# Patient Record
Sex: MALE | Race: WHITE | NOT HISPANIC OR LATINO
[De-identification: names, ages, dates, MRNs, and addresses within clinical notes are randomized per-mention and may not be internally consistent; named-entity substitution may affect disease eponyms.]

---

## 2017-04-11 PROBLEM — Z00.00 ENCOUNTER FOR PREVENTIVE HEALTH EXAMINATION: Status: ACTIVE | Noted: 2017-04-11

## 2017-05-10 ENCOUNTER — APPOINTMENT (OUTPATIENT)
Dept: PULMONOLOGY | Facility: CLINIC | Age: 62
End: 2017-05-10

## 2022-06-27 ENCOUNTER — EMERGENCY (EMERGENCY)
Facility: HOSPITAL | Age: 67
LOS: 1 days | Discharge: HOME | End: 2022-06-27
Attending: EMERGENCY MEDICINE | Admitting: EMERGENCY MEDICINE
Payer: COMMERCIAL

## 2022-06-27 VITALS
SYSTOLIC BLOOD PRESSURE: 126 MMHG | WEIGHT: 181 LBS | RESPIRATION RATE: 19 BRPM | HEIGHT: 68 IN | OXYGEN SATURATION: 100 % | HEART RATE: 66 BPM | TEMPERATURE: 98 F | DIASTOLIC BLOOD PRESSURE: 81 MMHG

## 2022-06-27 LAB
ALBUMIN SERPL ELPH-MCNC: 3.9 G/DL — SIGNIFICANT CHANGE UP (ref 3.3–5)
ALP SERPL-CCNC: 67 U/L — SIGNIFICANT CHANGE UP (ref 30–120)
ALT FLD-CCNC: 32 U/L DA — SIGNIFICANT CHANGE UP (ref 10–60)
AMPHET UR-MCNC: NEGATIVE — SIGNIFICANT CHANGE UP
ANION GAP SERPL CALC-SCNC: 7 MMOL/L — SIGNIFICANT CHANGE UP (ref 5–17)
APPEARANCE UR: CLEAR — SIGNIFICANT CHANGE UP
AST SERPL-CCNC: 32 U/L — SIGNIFICANT CHANGE UP (ref 10–40)
BARBITURATES UR SCN-MCNC: NEGATIVE — SIGNIFICANT CHANGE UP
BASOPHILS # BLD AUTO: 0.06 K/UL — SIGNIFICANT CHANGE UP (ref 0–0.2)
BASOPHILS NFR BLD AUTO: 0.7 % — SIGNIFICANT CHANGE UP (ref 0–2)
BENZODIAZ UR-MCNC: NEGATIVE — SIGNIFICANT CHANGE UP
BILIRUB SERPL-MCNC: 0.6 MG/DL — SIGNIFICANT CHANGE UP (ref 0.2–1.2)
BILIRUB UR-MCNC: NEGATIVE — SIGNIFICANT CHANGE UP
BUN SERPL-MCNC: 22 MG/DL — SIGNIFICANT CHANGE UP (ref 7–23)
CALCIUM SERPL-MCNC: 9.6 MG/DL — SIGNIFICANT CHANGE UP (ref 8.4–10.5)
CHLORIDE SERPL-SCNC: 102 MMOL/L — SIGNIFICANT CHANGE UP (ref 96–108)
CO2 SERPL-SCNC: 26 MMOL/L — SIGNIFICANT CHANGE UP (ref 22–31)
COCAINE METAB.OTHER UR-MCNC: NEGATIVE — SIGNIFICANT CHANGE UP
COLOR SPEC: YELLOW — SIGNIFICANT CHANGE UP
CREAT SERPL-MCNC: 1.09 MG/DL — SIGNIFICANT CHANGE UP (ref 0.5–1.3)
D DIMER BLD IA.RAPID-MCNC: <150 NG/ML DDU — SIGNIFICANT CHANGE UP
DIFF PNL FLD: NEGATIVE — SIGNIFICANT CHANGE UP
EGFR: 73 ML/MIN/1.73M2 — SIGNIFICANT CHANGE UP
EOSINOPHIL # BLD AUTO: 0.32 K/UL — SIGNIFICANT CHANGE UP (ref 0–0.5)
EOSINOPHIL NFR BLD AUTO: 3.8 % — SIGNIFICANT CHANGE UP (ref 0–6)
ETHANOL SERPL-MCNC: <3 MG/DL — SIGNIFICANT CHANGE UP (ref 0–3)
GLUCOSE SERPL-MCNC: 114 MG/DL — HIGH (ref 70–99)
GLUCOSE UR QL: NEGATIVE MG/DL — SIGNIFICANT CHANGE UP
HCT VFR BLD CALC: 45.9 % — SIGNIFICANT CHANGE UP (ref 39–50)
HGB BLD-MCNC: 15.4 G/DL — SIGNIFICANT CHANGE UP (ref 13–17)
IMM GRANULOCYTES NFR BLD AUTO: 1.1 % — SIGNIFICANT CHANGE UP (ref 0–1.5)
KETONES UR-MCNC: ABNORMAL
LEUKOCYTE ESTERASE UR-ACNC: NEGATIVE — SIGNIFICANT CHANGE UP
LYMPHOCYTES # BLD AUTO: 2.13 K/UL — SIGNIFICANT CHANGE UP (ref 1–3.3)
LYMPHOCYTES # BLD AUTO: 25.1 % — SIGNIFICANT CHANGE UP (ref 13–44)
MCHC RBC-ENTMCNC: 30.2 PG — SIGNIFICANT CHANGE UP (ref 27–34)
MCHC RBC-ENTMCNC: 33.6 GM/DL — SIGNIFICANT CHANGE UP (ref 32–36)
MCV RBC AUTO: 90 FL — SIGNIFICANT CHANGE UP (ref 80–100)
METHADONE UR-MCNC: NEGATIVE — SIGNIFICANT CHANGE UP
MONOCYTES # BLD AUTO: 1.24 K/UL — HIGH (ref 0–0.9)
MONOCYTES NFR BLD AUTO: 14.6 % — HIGH (ref 2–14)
NEUTROPHILS # BLD AUTO: 4.63 K/UL — SIGNIFICANT CHANGE UP (ref 1.8–7.4)
NEUTROPHILS NFR BLD AUTO: 54.7 % — SIGNIFICANT CHANGE UP (ref 43–77)
NITRITE UR-MCNC: NEGATIVE — SIGNIFICANT CHANGE UP
NRBC # BLD: 0 /100 WBCS — SIGNIFICANT CHANGE UP (ref 0–0)
NT-PROBNP SERPL-SCNC: 187 PG/ML — HIGH (ref 0–125)
OPIATES UR-MCNC: NEGATIVE — SIGNIFICANT CHANGE UP
PCP SPEC-MCNC: SIGNIFICANT CHANGE UP
PCP UR-MCNC: NEGATIVE — SIGNIFICANT CHANGE UP
PH UR: 5 — SIGNIFICANT CHANGE UP (ref 5–8)
PLATELET # BLD AUTO: 263 K/UL — SIGNIFICANT CHANGE UP (ref 150–400)
POTASSIUM SERPL-MCNC: 3.6 MMOL/L — SIGNIFICANT CHANGE UP (ref 3.5–5.3)
POTASSIUM SERPL-SCNC: 3.6 MMOL/L — SIGNIFICANT CHANGE UP (ref 3.5–5.3)
PROT SERPL-MCNC: 7.4 G/DL — SIGNIFICANT CHANGE UP (ref 6–8.3)
PROT UR-MCNC: NEGATIVE MG/DL — SIGNIFICANT CHANGE UP
RBC # BLD: 5.1 M/UL — SIGNIFICANT CHANGE UP (ref 4.2–5.8)
RBC # FLD: 12.2 % — SIGNIFICANT CHANGE UP (ref 10.3–14.5)
SARS-COV-2 RNA SPEC QL NAA+PROBE: SIGNIFICANT CHANGE UP
SODIUM SERPL-SCNC: 135 MMOL/L — SIGNIFICANT CHANGE UP (ref 135–145)
SP GR SPEC: 1.01 — SIGNIFICANT CHANGE UP (ref 1.01–1.02)
THC UR QL: NEGATIVE — SIGNIFICANT CHANGE UP
TROPONIN I, HIGH SENSITIVITY RESULT: 7.3 NG/L — SIGNIFICANT CHANGE UP
TROPONIN I, HIGH SENSITIVITY RESULT: 7.8 NG/L — SIGNIFICANT CHANGE UP
UROBILINOGEN FLD QL: NEGATIVE MG/DL — SIGNIFICANT CHANGE UP
WBC # BLD: 8.47 K/UL — SIGNIFICANT CHANGE UP (ref 3.8–10.5)
WBC # FLD AUTO: 8.47 K/UL — SIGNIFICANT CHANGE UP (ref 3.8–10.5)

## 2022-06-27 PROCEDURE — 96360 HYDRATION IV INFUSION INIT: CPT | Mod: XU

## 2022-06-27 PROCEDURE — 87635 SARS-COV-2 COVID-19 AMP PRB: CPT

## 2022-06-27 PROCEDURE — 71045 X-RAY EXAM CHEST 1 VIEW: CPT | Mod: 26

## 2022-06-27 PROCEDURE — 96361 HYDRATE IV INFUSION ADD-ON: CPT

## 2022-06-27 PROCEDURE — 81003 URINALYSIS AUTO W/O SCOPE: CPT

## 2022-06-27 PROCEDURE — 70450 CT HEAD/BRAIN W/O DYE: CPT | Mod: MA

## 2022-06-27 PROCEDURE — 84484 ASSAY OF TROPONIN QUANT: CPT

## 2022-06-27 PROCEDURE — 83880 ASSAY OF NATRIURETIC PEPTIDE: CPT

## 2022-06-27 PROCEDURE — 93010 ELECTROCARDIOGRAM REPORT: CPT

## 2022-06-27 PROCEDURE — 85025 COMPLETE CBC W/AUTO DIFF WBC: CPT

## 2022-06-27 PROCEDURE — 71045 X-RAY EXAM CHEST 1 VIEW: CPT

## 2022-06-27 PROCEDURE — 74177 CT ABD & PELVIS W/CONTRAST: CPT | Mod: 26,MA

## 2022-06-27 PROCEDURE — 85379 FIBRIN DEGRADATION QUANT: CPT

## 2022-06-27 PROCEDURE — 93005 ELECTROCARDIOGRAM TRACING: CPT

## 2022-06-27 PROCEDURE — 74177 CT ABD & PELVIS W/CONTRAST: CPT | Mod: MA

## 2022-06-27 PROCEDURE — 80053 COMPREHEN METABOLIC PANEL: CPT

## 2022-06-27 PROCEDURE — 36415 COLL VENOUS BLD VENIPUNCTURE: CPT

## 2022-06-27 PROCEDURE — 99285 EMERGENCY DEPT VISIT HI MDM: CPT

## 2022-06-27 PROCEDURE — 80307 DRUG TEST PRSMV CHEM ANLYZR: CPT

## 2022-06-27 PROCEDURE — 70450 CT HEAD/BRAIN W/O DYE: CPT | Mod: 26,MA

## 2022-06-27 PROCEDURE — 82962 GLUCOSE BLOOD TEST: CPT

## 2022-06-27 PROCEDURE — 99285 EMERGENCY DEPT VISIT HI MDM: CPT | Mod: 25

## 2022-06-27 RX ORDER — SODIUM CHLORIDE 9 MG/ML
1000 INJECTION INTRAMUSCULAR; INTRAVENOUS; SUBCUTANEOUS ONCE
Refills: 0 | Status: COMPLETED | OUTPATIENT
Start: 2022-06-27 | End: 2022-06-27

## 2022-06-27 RX ORDER — DIAZEPAM 5 MG
5 TABLET ORAL ONCE
Refills: 0 | Status: DISCONTINUED | OUTPATIENT
Start: 2022-06-27 | End: 2022-06-27

## 2022-06-27 RX ADMIN — SODIUM CHLORIDE 1000 MILLILITER(S): 9 INJECTION INTRAMUSCULAR; INTRAVENOUS; SUBCUTANEOUS at 20:00

## 2022-06-27 RX ADMIN — SODIUM CHLORIDE 1000 MILLILITER(S): 9 INJECTION INTRAMUSCULAR; INTRAVENOUS; SUBCUTANEOUS at 21:42

## 2022-06-27 RX ADMIN — SODIUM CHLORIDE 1000 MILLILITER(S): 9 INJECTION INTRAMUSCULAR; INTRAVENOUS; SUBCUTANEOUS at 22:34

## 2022-06-27 RX ADMIN — SODIUM CHLORIDE 1000 MILLILITER(S): 9 INJECTION INTRAMUSCULAR; INTRAVENOUS; SUBCUTANEOUS at 21:00

## 2022-06-27 NOTE — ED PROVIDER NOTE - CLINICAL SUMMARY MEDICAL DECISION MAKING FREE TEXT BOX
Pt with multiple sx with no obvious etiology from hx or physical. Will get labs, EKG. and give IV fluids and reassess when results available.

## 2022-06-27 NOTE — ED PROVIDER NOTE - PROGRESS NOTE DETAILS
Patient feels better. States he may have had a panic attack Patient feels better. States he may have had a panic attack. Ambulating, calm.

## 2022-06-27 NOTE — ED PROVIDER NOTE - NS_ ATTENDINGSCRIBEDETAILS _ED_A_ED_FT
Francis Cosby MD - The scribe's documentation has been prepared under my direction and personally reviewed by me in its entirety. I confirm that the note above accurately reflects all work, treatment, procedures, and medical decision making performed by me.

## 2022-06-27 NOTE — ED PROVIDER NOTE - CHPI ED SYMPTOMS POS
trouble swallowing, chest discomfort, abdominal discomfort, dry mouth/dehydrated/DIZZINESS/SHORTNESS OF BREATH/WEAKNESS

## 2022-06-27 NOTE — ED PROVIDER NOTE - OBJECTIVE STATEMENT
66 y/o male presents to the ED c/o feeling weak, some sob, dizziness, trouble swallowing, discomfort in chest and abdomen, dry mouth and dehydration for an hour. Pt has hx of MI, states this sensation is different. Pt uses Bronkaid daily for recreational use. Denies fever.

## 2022-06-27 NOTE — ED ADULT TRIAGE NOTE - CHIEF COMPLAINT QUOTE
c/o chest pressure, weakness x1 hour. c/o numbness to fingertips x1 week. "I think I'm taking too much Bronkaid. I take a box a day for fun". Unprescribed.

## 2022-06-27 NOTE — ED PROVIDER NOTE - PATIENT PORTAL LINK FT
You can access the FollowMyHealth Patient Portal offered by [facility] by registering at the following website: [email or web address]. By joining [facility]’s FollowMyHealth portal, you will also be able to view your health information using other applications (apps) compatible with our system.

## 2022-06-27 NOTE — ED PROVIDER NOTE - CARE PROVIDER_API CALL
Dong Abraham  INTERNAL MEDICINE  [address] [place] [address]  Phone: [phone]  Fax: [phone]  Follow Up Time:

## 2022-06-27 NOTE — ED PROVIDER NOTE - NSFOLLOWUPINSTRUCTIONS_ED_ALL_ED_FT
Panic Attack      A panic attack is a sudden episode of severe anxiety, fear, or discomfort that causes physical and emotional symptoms. The attack may be in response to something frightening, or it may occur for no known reason.    Symptoms of a panic attack can be similar to symptoms of a heart attack or stroke. It is important to see your health care provider when you have a panic attack so that these conditions can be ruled out.    A panic attack is a symptom of another condition. Most panic attacks go away with treatment of the underlying problem. If you have panic attacks often, you may have a condition called panic disorder.      What are the causes?    A panic attack may be caused by:  •An extreme, life-threatening situation, such as a war or natural disaster.      •An anxiety disorder, such as post-traumatic stress disorder.      •Depression.      •Certain medical conditions, including heart problems, neurological conditions, and infections.      •Certain over-the-counter and prescription medicines.      •Illegal drugs that increase heart rate and blood pressure, such as methamphetamine.      •Alcohol.      •Supplements that increase anxiety.      •Panic disorder.        What increases the risk?    You are more likely to develop this condition if:  •You have an anxiety disorder.      •You have another mental health condition.      •You take certain medicines.      •You use alcohol, illegal drugs, or other substances.      •You are under extreme stress.      •A life event is causing increased feelings of anxiety and depression.        What are the signs or symptoms?    A panic attack starts suddenly, usually lasts about 20 minutes, and occurs with one or more of the following:  •A pounding heart.      •A feeling that your heart is beating irregularly or faster than normal (palpitations).      •Sweating.      •Trembling or shaking.      •Shortness of breath or feeling smothered.      •Feeling choked.      •Chest pain or discomfort.      •Nausea or a strange feeling in your stomach.      •Dizziness, feeling lightheaded, or feeling like you might faint.      •Chills or hot flashes.      •Numbness or tingling in your lips, hands, or feet.      •Feeling confused, or feeling that you are not yourself.      •Fear of losing control or being emotionally unstable.      •Fear of dying.        How is this diagnosed?     A panic attack is diagnosed with an assessment by your health care provider. During the assessment your health care provider will ask questions about:  •Your history of anxiety, depression, and panic attacks.      •Your medical history.      •Whether you drink alcohol, use illegal drugs, take supplements, or take medicines. Be honest about your substance use.      Your health care provider may also:  •Order blood tests or other kinds of tests to rule out serious medical conditions.      •Refer you to a mental health professional for further evaluation.        How is this treated?    Treatment depends on the cause of the panic attack:  •If the cause is a medical problem, your health care provider will either treat that problem or refer you to a specialist.      •If the cause is emotional, you may be given anti-anxiety medicines or referred to a counselor. These medicines may reduce how often attacks happen, reduce how severe the attacks are, and lower anxiety.      •If the cause is a medicine, your health care provider may tell you to stop the medicine, change your dose, or take a different medicine.      •If the cause is a drug, treatment may involve letting the drug wear off and taking medicine to help the drug leave your body or to counteract its effects. Attacks caused by drug abuse may continue even if you stop using the drug.        Follow these instructions at home:    •Take over-the-counter and prescription medicines only as told by your health care provider.      •If you feel anxious, limit your caffeine intake.    •Take good care of your physical and mental health by:  •Eating a balanced diet that includes plenty of fresh fruits and vegetables, whole grains, lean meats, and low-fat dairy.      •Getting plenty of rest. Try to get 7–8 hours of uninterrupted sleep each night.      •Exercising regularly. Try to get 30 minutes of physical activity at least 5 days a week.      •Not smoking. Talk to your health care provider if you need help quitting.      •Limiting alcohol intake to no more than 1 drink a day for nonpregnant women and 2 drinks a day for men. One drink equals 12 oz of beer, 5 oz of wine, or 1½ oz of hard liquor.        •Keep all follow-up visits as told by your health care provider. This is important. Panic attacks may have underlying physical or emotional problems that take time to accurately diagnose.        Contact a health care provider if:    •Your symptoms do not improve, or they get worse.      •You are not able to take your medicine as prescribed because of side effects.        Get help right away if:    •You have serious thoughts about hurting yourself or others.      •You have symptoms of a panic attack. Do not drive yourself to the hospital. Have someone else drive you or call an ambulance.      If you ever feel like you may hurt yourself or others, or you have thoughts about taking your own life, get help right away. You can go to your nearest emergency department or call:   • Your local emergency services (911 in the [place]       • A suicide crisis helpline, such as the National Suicide Prevention Lifeline at 1-867.512.8110. This is open 24 hours a day.         Summary    •A panic attack is a sign of a serious health or mental health condition. Get help right away. Do not drive yourself to the hospital. Have someone else drive you or call an ambulance.      •Always see a health care provider to have the reasons for the panic attack correctly diagnosed.      •If your panic attack was caused by a physical problem, follow your health care provider's suggestions for medicine, referral to a specialist, and lifestyle changes.      •If your panic attack was caused by an emotional problem, follow through with counseling from a qualified mental health specialist.      •If you feel like you may hurt yourself or others, call 911 and get help right away.      This information is not intended to replace advice given to you by your health care provider. Make sure you discuss any questions you have with your health care provider.

## 2022-06-27 NOTE — ED ADULT TRIAGE NOTE - NS ED TRIAGE EKG
All questions answered for Ese.   
Ese Carver with [facility] needs to know what lab facility these tests were performed at.     Writer does not have access to [facility].   
I believe it was capillary and done through [facility]  
Lead result 2 according test results.   Unable to get a hold of Ese from the [facility] Health.   Lead level result left in inbox. Will continue to try to reach department of [facility].   
Looking for blood lead results from 2011.  
Ok, I printed the results  
EKG completed

## 2022-06-28 VITALS
OXYGEN SATURATION: 98 % | RESPIRATION RATE: 18 BRPM | DIASTOLIC BLOOD PRESSURE: 77 MMHG | SYSTOLIC BLOOD PRESSURE: 133 MMHG | HEART RATE: 66 BPM

## 2023-05-31 NOTE — ED ADULT NURSE NOTE - PRIMARY CARE PROVIDER
(NEW SCRIPT)    [facility]RAYDEN REQUESTING MED REFILL FOR NORCO 5-325 MG.    DIRECTIONS: NORCO 5-325 MG 1 TAB PO Q 6 HRS PRN.   beryl

## 2023-09-26 ENCOUNTER — INPATIENT (INPATIENT)
Facility: HOSPITAL | Age: 68
LOS: 1 days | Discharge: HOME | DRG: 917 | End: 2023-09-28
Attending: HOSPITALIST | Admitting: HOSPITALIST
Payer: COMMERCIAL

## 2023-09-26 VITALS
HEIGHT: 69 IN | OXYGEN SATURATION: 100 % | HEART RATE: 70 BPM | DIASTOLIC BLOOD PRESSURE: 119 MMHG | TEMPERATURE: 99 F | RESPIRATION RATE: 25 BRPM | WEIGHT: 199.96 LBS | SYSTOLIC BLOOD PRESSURE: 233 MMHG

## 2023-09-26 DIAGNOSIS — Z98.890 OTHER SPECIFIED POSTPROCEDURAL STATES: Chronic | ICD-10-CM

## 2023-09-26 DIAGNOSIS — Z98.52 VASECTOMY STATUS: Chronic | ICD-10-CM

## 2023-09-26 DIAGNOSIS — I16.0 HYPERTENSIVE URGENCY: ICD-10-CM

## 2023-09-26 LAB
ALBUMIN SERPL ELPH-MCNC: 4.4 G/DL — SIGNIFICANT CHANGE UP (ref 3.3–5)
ALP SERPL-CCNC: 75 U/L — SIGNIFICANT CHANGE UP (ref 30–120)
ALT FLD-CCNC: 33 U/L — SIGNIFICANT CHANGE UP (ref 10–60)
ANION GAP SERPL CALC-SCNC: 11 MMOL/L — SIGNIFICANT CHANGE UP (ref 5–17)
APAP SERPL-MCNC: <1 — SIGNIFICANT CHANGE UP (ref 10–30)
AST SERPL-CCNC: 35 U/L — SIGNIFICANT CHANGE UP (ref 10–40)
BASOPHILS # BLD AUTO: 0.06 K/UL — SIGNIFICANT CHANGE UP (ref 0–0.2)
BASOPHILS NFR BLD AUTO: 0.6 % — SIGNIFICANT CHANGE UP (ref 0–2)
BILIRUB SERPL-MCNC: 1.2 MG/DL — SIGNIFICANT CHANGE UP (ref 0.2–1.2)
BUN SERPL-MCNC: 15 MG/DL — SIGNIFICANT CHANGE UP (ref 7–23)
CALCIUM SERPL-MCNC: 10.8 MG/DL — HIGH (ref 8.4–10.5)
CHLORIDE SERPL-SCNC: 99 MMOL/L — SIGNIFICANT CHANGE UP (ref 96–108)
CK SERPL-CCNC: 291 U/L — SIGNIFICANT CHANGE UP (ref 39–308)
CO2 SERPL-SCNC: 26 MMOL/L — SIGNIFICANT CHANGE UP (ref 22–31)
CREAT SERPL-MCNC: 1.17 MG/DL — SIGNIFICANT CHANGE UP (ref 0.5–1.3)
D DIMER BLD IA.RAPID-MCNC: <150 NG/ML DDU — SIGNIFICANT CHANGE UP
EGFR: 67 ML/MIN/1.73M2 — SIGNIFICANT CHANGE UP
EOSINOPHIL # BLD AUTO: 0.44 K/UL — SIGNIFICANT CHANGE UP (ref 0–0.5)
EOSINOPHIL NFR BLD AUTO: 4.3 % — SIGNIFICANT CHANGE UP (ref 0–6)
ETHANOL SERPL-MCNC: <3 MG/DL — SIGNIFICANT CHANGE UP (ref 0–3)
GLUCOSE SERPL-MCNC: 108 MG/DL — HIGH (ref 70–99)
HCT VFR BLD CALC: 51.3 % — HIGH (ref 39–50)
HGB BLD-MCNC: 17 G/DL — SIGNIFICANT CHANGE UP (ref 13–17)
IMM GRANULOCYTES NFR BLD AUTO: 0.4 % — SIGNIFICANT CHANGE UP (ref 0–0.9)
LYMPHOCYTES # BLD AUTO: 1.48 K/UL — SIGNIFICANT CHANGE UP (ref 1–3.3)
LYMPHOCYTES # BLD AUTO: 14.5 % — SIGNIFICANT CHANGE UP (ref 13–44)
MCHC RBC-ENTMCNC: 29.3 PG — SIGNIFICANT CHANGE UP (ref 27–34)
MCHC RBC-ENTMCNC: 33.1 GM/DL — SIGNIFICANT CHANGE UP (ref 32–36)
MCV RBC AUTO: 88.4 FL — SIGNIFICANT CHANGE UP (ref 80–100)
MONOCYTES # BLD AUTO: 1.18 K/UL — HIGH (ref 0–0.9)
MONOCYTES NFR BLD AUTO: 11.6 % — SIGNIFICANT CHANGE UP (ref 2–14)
NEUTROPHILS # BLD AUTO: 7.01 K/UL — SIGNIFICANT CHANGE UP (ref 1.8–7.4)
NEUTROPHILS NFR BLD AUTO: 68.6 % — SIGNIFICANT CHANGE UP (ref 43–77)
NRBC # BLD: 0 /100 WBCS — SIGNIFICANT CHANGE UP (ref 0–0)
PCP SPEC-MCNC: SIGNIFICANT CHANGE UP
PLATELET # BLD AUTO: 233 K/UL — SIGNIFICANT CHANGE UP (ref 150–400)
POTASSIUM SERPL-MCNC: 4 MMOL/L — SIGNIFICANT CHANGE UP (ref 3.5–5.3)
POTASSIUM SERPL-SCNC: 4 MMOL/L — SIGNIFICANT CHANGE UP (ref 3.5–5.3)
PROT SERPL-MCNC: 8.1 G/DL — SIGNIFICANT CHANGE UP (ref 6–8.3)
RAPID RVP RESULT: SIGNIFICANT CHANGE UP
RBC # BLD: 5.8 M/UL — SIGNIFICANT CHANGE UP (ref 4.2–5.8)
RBC # FLD: 12.3 % — SIGNIFICANT CHANGE UP (ref 10.3–14.5)
SALICYLATES SERPL-MCNC: 0.7 MG/DL — LOW (ref 2.8–20)
SARS-COV-2 RNA SPEC QL NAA+PROBE: SIGNIFICANT CHANGE UP
SODIUM SERPL-SCNC: 136 MMOL/L — SIGNIFICANT CHANGE UP (ref 135–145)
TROPONIN I, HIGH SENSITIVITY RESULT: 6.2 NG/L — SIGNIFICANT CHANGE UP
WBC # BLD: 10.21 K/UL — SIGNIFICANT CHANGE UP (ref 3.8–10.5)
WBC # FLD AUTO: 10.21 K/UL — SIGNIFICANT CHANGE UP (ref 3.8–10.5)

## 2023-09-26 PROCEDURE — 71045 X-RAY EXAM CHEST 1 VIEW: CPT | Mod: 26

## 2023-09-26 PROCEDURE — 99223 1ST HOSP IP/OBS HIGH 75: CPT

## 2023-09-26 PROCEDURE — 74174 CTA ABD&PLVS W/CONTRAST: CPT | Mod: 26,MA

## 2023-09-26 PROCEDURE — 99291 CRITICAL CARE FIRST HOUR: CPT

## 2023-09-26 PROCEDURE — 71275 CT ANGIOGRAPHY CHEST: CPT | Mod: 26,MA

## 2023-09-26 PROCEDURE — 93010 ELECTROCARDIOGRAM REPORT: CPT | Mod: 76

## 2023-09-26 PROCEDURE — 70450 CT HEAD/BRAIN W/O DYE: CPT | Mod: 26,MA

## 2023-09-26 RX ORDER — CLOPIDOGREL BISULFATE 75 MG/1
75 TABLET, FILM COATED ORAL DAILY
Refills: 0 | Status: DISCONTINUED | OUTPATIENT
Start: 2023-09-26 | End: 2023-09-28

## 2023-09-26 RX ORDER — SODIUM CHLORIDE 9 MG/ML
1000 INJECTION INTRAMUSCULAR; INTRAVENOUS; SUBCUTANEOUS
Refills: 0 | Status: COMPLETED | OUTPATIENT
Start: 2023-09-26 | End: 2023-09-26

## 2023-09-26 RX ORDER — LABETALOL HCL 100 MG
10 TABLET ORAL ONCE
Refills: 0 | Status: COMPLETED | OUTPATIENT
Start: 2023-09-26 | End: 2023-09-26

## 2023-09-26 RX ORDER — ATORVASTATIN CALCIUM 80 MG/1
40 TABLET, FILM COATED ORAL AT BEDTIME
Refills: 0 | Status: DISCONTINUED | OUTPATIENT
Start: 2023-09-26 | End: 2023-09-28

## 2023-09-26 RX ORDER — ONDANSETRON 8 MG/1
4 TABLET, FILM COATED ORAL EVERY 8 HOURS
Refills: 0 | Status: DISCONTINUED | OUTPATIENT
Start: 2023-09-26 | End: 2023-09-26

## 2023-09-26 RX ORDER — ASPIRIN/CALCIUM CARB/MAGNESIUM 324 MG
81 TABLET ORAL DAILY
Refills: 0 | Status: DISCONTINUED | OUTPATIENT
Start: 2023-09-26 | End: 2023-09-28

## 2023-09-26 RX ORDER — CITALOPRAM 10 MG/1
40 TABLET, FILM COATED ORAL DAILY
Refills: 0 | Status: DISCONTINUED | OUTPATIENT
Start: 2023-09-26 | End: 2023-09-26

## 2023-09-26 RX ORDER — ONDANSETRON 8 MG/1
4 TABLET, FILM COATED ORAL ONCE
Refills: 0 | Status: COMPLETED | OUTPATIENT
Start: 2023-09-26 | End: 2023-09-26

## 2023-09-26 RX ORDER — RAMIPRIL 5 MG
1 CAPSULE ORAL
Qty: 0 | Refills: 0 | DISCHARGE

## 2023-09-26 RX ORDER — METOPROLOL TARTRATE 50 MG
25 TABLET ORAL DAILY
Refills: 0 | Status: DISCONTINUED | OUTPATIENT
Start: 2023-09-26 | End: 2023-09-26

## 2023-09-26 RX ORDER — ALPRAZOLAM 0.25 MG
0 TABLET ORAL
Qty: 0 | Refills: 0 | DISCHARGE

## 2023-09-26 RX ORDER — ATOMOXETINE HYDROCHLORIDE 10 MG/1
1 CAPSULE ORAL
Qty: 0 | Refills: 0 | DISCHARGE

## 2023-09-26 RX ORDER — MORPHINE SULFATE 50 MG/1
4 CAPSULE, EXTENDED RELEASE ORAL ONCE
Refills: 0 | Status: DISCONTINUED | OUTPATIENT
Start: 2023-09-26 | End: 2023-09-26

## 2023-09-26 RX ORDER — INFLUENZA VIRUS VACCINE 15; 15; 15; 15 UG/.5ML; UG/.5ML; UG/.5ML; UG/.5ML
0.7 SUSPENSION INTRAMUSCULAR ONCE
Refills: 0 | Status: COMPLETED | OUTPATIENT
Start: 2023-09-26 | End: 2023-09-26

## 2023-09-26 RX ORDER — CLOPIDOGREL BISULFATE 75 MG/1
1 TABLET, FILM COATED ORAL
Qty: 0 | Refills: 0 | DISCHARGE

## 2023-09-26 RX ORDER — CITALOPRAM 10 MG/1
0 TABLET, FILM COATED ORAL
Qty: 0 | Refills: 0 | DISCHARGE

## 2023-09-26 RX ORDER — ENOXAPARIN SODIUM 100 MG/ML
40 INJECTION SUBCUTANEOUS EVERY 24 HOURS
Refills: 0 | Status: DISCONTINUED | OUTPATIENT
Start: 2023-09-26 | End: 2023-09-28

## 2023-09-26 RX ORDER — ASPIRIN/CALCIUM CARB/MAGNESIUM 324 MG
0 TABLET ORAL
Qty: 0 | Refills: 0 | DISCHARGE

## 2023-09-26 RX ORDER — LANOLIN ALCOHOL/MO/W.PET/CERES
3 CREAM (GRAM) TOPICAL AT BEDTIME
Refills: 0 | Status: DISCONTINUED | OUTPATIENT
Start: 2023-09-26 | End: 2023-09-28

## 2023-09-26 RX ORDER — ATORVASTATIN CALCIUM 80 MG/1
1 TABLET, FILM COATED ORAL
Qty: 0 | Refills: 0 | DISCHARGE

## 2023-09-26 RX ORDER — ACETAMINOPHEN 500 MG
650 TABLET ORAL EVERY 6 HOURS
Refills: 0 | Status: DISCONTINUED | OUTPATIENT
Start: 2023-09-26 | End: 2023-09-28

## 2023-09-26 RX ORDER — LISINOPRIL 2.5 MG/1
10 TABLET ORAL DAILY
Refills: 0 | Status: DISCONTINUED | OUTPATIENT
Start: 2023-09-26 | End: 2023-09-28

## 2023-09-26 RX ADMIN — ATORVASTATIN CALCIUM 40 MILLIGRAM(S): 80 TABLET, FILM COATED ORAL at 22:40

## 2023-09-26 RX ADMIN — MORPHINE SULFATE 4 MILLIGRAM(S): 50 CAPSULE, EXTENDED RELEASE ORAL at 17:44

## 2023-09-26 RX ADMIN — ONDANSETRON 4 MILLIGRAM(S): 8 TABLET, FILM COATED ORAL at 17:44

## 2023-09-26 RX ADMIN — SODIUM CHLORIDE 1000 MILLILITER(S): 9 INJECTION INTRAMUSCULAR; INTRAVENOUS; SUBCUTANEOUS at 19:10

## 2023-09-26 RX ADMIN — Medication 1 MILLIGRAM(S): at 18:30

## 2023-09-26 RX ADMIN — Medication 10 MILLIGRAM(S): at 17:20

## 2023-09-26 RX ADMIN — MORPHINE SULFATE 4 MILLIGRAM(S): 50 CAPSULE, EXTENDED RELEASE ORAL at 18:45

## 2023-09-26 RX ADMIN — Medication 3 MILLIGRAM(S): at 22:40

## 2023-09-26 RX ADMIN — SODIUM CHLORIDE 2000 MILLILITER(S): 9 INJECTION INTRAMUSCULAR; INTRAVENOUS; SUBCUTANEOUS at 17:20

## 2023-09-26 NOTE — ED PROVIDER NOTE - DIFFERENTIAL DIAGNOSIS
Rule out acute MI, acute dissection, acute abdominal pathology, aortic aneurysm, other acute pathology Differential Diagnosis

## 2023-09-26 NOTE — CONSULT NOTE ADULT - ASSESSMENT
Assessment/Plan  67 y/o M with pmhc of HTN, HLD, CAD with PCI is admitted to ICU for active management of   1. HTN crisis -- resolved  2. Sympathomimetic overdose     - Originally presented in hypertensive crisis treated with labetolol/ ativan, BP now in 150/160;s, symptoms have resolved. Goal SBP around 160.   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 Now resolved. Troponins wnl, will trend. EKG without ischemic changes   - CTA showed LVH likely consistent with prolonged HTN. TTE to evaluate EF.   - Discussed case with tox fellow from [facility] poison control, plan agreed on     Case dsicussed with eICU attending Dr Espinoza      Time spent on this patient encounter, which includes documenting this note in the electronic medical record, was 77 minutes including assessing the presenting problems with associated risks, reviewing the medical record to prepare for the encounter, and meeting face to face with patient to obtain additional history. I have also performed an appropriate physical exam, made interventions listed and ordered and interpreted appropriate diagnostic studies as documented. To improve communication and patient safety, I have coordinated care with the multidisciplinary team including the bedside nurse, appropriate attending of record and consultants as needed.  Assessment/Plan  67 y/o M with pmhc of HTN, HLD, CAD with PCI is admitted to ICU for active management of   1. HTN crisis -- resolved  2. Sympathomimetic overdose     - Originally presented in hypertensive crisis treated with labetolol/ ativan, BP now in 150/160;s, symptoms have resolved. Goal SBP around 160.   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 Now resolved. Troponins wnl, will trend. EKG without ischemic changes   - CTA showed LVH likely consistent with prolonged HTN. TTE to evaluate EF.   - DAPT  - Discussed case with tox fellow from [facility] poison control, plan agreed on     Case dsicussed with eICU attending Dr Espinoza      Time spent on this patient encounter, which includes documenting this note in the electronic medical record, was 77 minutes including assessing the presenting problems with associated risks, reviewing the medical record to prepare for the encounter, and meeting face to face with patient to obtain additional history. I have also performed an appropriate physical exam, made interventions listed and ordered and interpreted appropriate diagnostic studies as documented. To improve communication and patient safety, I have coordinated care with the multidisciplinary team including the bedside nurse, appropriate attending of record and consultants as needed.

## 2023-09-26 NOTE — ED ADULT NURSE NOTE - NSFALLUNIVINTERV_ED_ALL_ED
Bed/Stretcher in lowest position, wheels locked, appropriate side rails in place/Call bell, personal items and telephone in reach/Instruct patient to call for assistance before getting out of bed/chair/stretcher/Non-slip footwear applied when patient is off stretcher/[place] to call system/Physically safe environment - no spills, clutter or unnecessary equipment/Purposeful proactive rounding/Room/bathroom lighting operational, light cord in reach

## 2023-09-26 NOTE — PROVIDER CONTACT NOTE (EICU) - BACKGROUND
68 year old male with HTN, CAD s/p PCI, High chol  Who p/w Chest pressure and SOB found to have   Of note patient bronkaid around clock and been taking multiple pills of it.

## 2023-09-26 NOTE — ED PROVIDER NOTE - PROGRESS NOTE DETAILS
Patient with some urinary retention, [facility] placed with some improvement in abdominal symptoms.  Patient still hypertensive. Discussed at length with patient's wife who states that the patient is addicted to a medication called Bronkaid, which he takes for the ephedrine that is in it.  She states that he takes up to 100 pills/day.  At this time patient is denying knowing what Bronkaid is.  He is unwilling to tell us how much he took today.  Patient is still complaining of generalized pain, feels as if he is out of it.  No other acute changes.  Patient's blood pressure is currently 199/105.  We will continue to monitor and treat.   Tox Fellow paged Patient's blood pressure improved after Ativan dose, patient appears to be in less discomfort at this time.  We will continue to monitor. Called multiple pages to toxicology fellow, no response.  Called [facility] poison control, after 25 minutes on the phone, they state that the fellow will call me back.  Patient's wife states that the patient is not fully acting himself, will check CT head at this time. Patient states he is more comfortable, he is awake and alert, states he does not remember taking any pills.  The wife states that he definitely has been taking this, however he is slightly out of it today. Discussed with ICU PA, will come to evaluate the patient. [employer] Tox Fellow, from [facility] Poison control - states will dw her attn. ? phentolamine if gets hypertensive again due to unopposed alpha.

## 2023-09-26 NOTE — H&P ADULT - NSICDXPASTMEDICALHX_GEN_ALL_CORE_FT
PAST MEDICAL HISTORY:  ADD (attention deficit disorder)     Benign prostate hyperplasia     Hyperlipidemia     Stented coronary artery

## 2023-09-26 NOTE — H&P ADULT - NSHPLABSRESULTS_GEN_ALL_CORE
LABS:                            17.0   10.21 )-----------( 233      ( 26 Sep 2023 17:08 )             51.3<H>    136    |  99     |  15     ----------------------------<  108<H>    26 Sep 2023 17:08  4.0     |  26     |  1.17         Ca 10.8<H>         26 Sep 2023 17:08        TPro  8.1    /  Alb  4.4    /  TBili  1.2    /  DBili  x      /  AST  35     /  ALT  33     /  AlkPhos  75     26 Sep 2023 17:08      Urinalysis Basic - ( 26 Sep 2023 17:08 )    Color: x / Appearance: x / SG: x / pH: x  Gluc: 108 mg/dL / Ketone: x  / Bili: x / Urobili: x   Blood: x / Protein: x / Nitrite: x   Leuk Esterase: x / RBC: x / WBC x   Sq Epi: x / Non Sq Epi: x / Bacteria: x    Troponin trend:  Troponin I, High Sensitivity Result: 6.2 ng/L (09-26-23 @ 17:08)    Radiology:  < from: CT Head No Cont (09.26.23 @ 19:07) >    IMPRESSION: Age-appropriate involutional and ischemic gliotic changes. No   obvious hemorrhage. Limited by the presence of intravascular contrast. No   significant change since 6/27/2022.    < end of copied text >    < from: CT Angio Chest Aorta w/wo IV Cont (09.26.23 @ 17:29) >    FINDINGS:    Image quality degradeddue to motion artifact.    AORTA: No aortic dissection. No aortic aneurysm. Moderate calcific   atherosclerotic disease of the abdominal aorta.    MEDIASTINUM: Enlarged left atrium and left ventricle. Coronary   atherosclerosis. No pericardial effusion. No large mediastinal lymph   nodes.    AIRWAYS, LUNGS, PLEURA: Trachea and mainstem bronchi patent. No   consolidation. No pleural effusion.    ABDOMEN and PELVIS: Multiple left renal hypodense lesions unchanged   compared to 6/27/2022. Abdominal organs otherwise unremarkable.   Unremarkable urinary bladder. Enlarged prostate.    Colonic diverticulosis. No bowel obstruction. No free fluid. No abdominal   or pelvic lymphadenopathy.    BONES: Unremarkable.    SOFT TISSUES: Unremarkable.    IMPRESSION:    No aortic dissection.    No aortic aneurysm.    < end of copied text >

## 2023-09-26 NOTE — H&P ADULT - HISTORY OF PRESENT ILLNESS
***Patient with AMS and cannot recall any events of the day.  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  Per wife, patient was in his usual state of health until this morning when he told her that he was not feeling well.  Reportedly patient tested himself for COVID and was negative.   ***Patient with AMS and cannot recall any events of the day.  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  Per wife, patient was in his usual state of health until this morning when he told her that he was not feeling well.  Reportedly patient tested himself for COVID and was negative.  Wife left the house but then in the afternoon the patient called her saying he could not breathe.  Wife rushed home and found the patient sitting in the car.  Was brought to the bathroom where the patient still had SOB and started to complain of excruciating abdominal pain,  ***Patient with AMS and cannot recall any events of the day.  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  Per wife, patient was in his usual state of health until this morning when he told her that he was not feeling well.  Reportedly patient tested himself for COVID and was negative.  Wife left the house but then in the afternoon the patient called her saying he could not breathe.  Wife rushed home and found the patient sitting in the car.  Was brought to the bathroom where the patient still had SOB and started to complain of excruciating abdominal pain,  Also noted to be weak and unsteady on his feet.  Wife immediately drove the patient here where he was noted to be disorientated and agitated with triage vitals of /119  HR 70  RR 25, 1000% and T 98.9F.  Labs were mostly unremarkable.  Head CT was negative.  CTA of chest and A/P showed "no aortic dissection and no aortic aneurysm."  Patient was given ativan and labetalol for his agitation and hypertension respectively.   Utox was +opiate (though was taken after he was given morphine).  Patient eventually calmed down and his BP eventually went down to 123/78.  Patient became more lucid and less agitated.  However, patient could not recall any of the events from earlier today.  He said that he abdomen does not hurt at the moment as well.  As per wife, patient takes large dose of Bronkaid which includes ephedrine but cannot say how much he takes currently (though she reports he has taken as many as 100 pills in the past).  He also drinks a lot of caffeine and soda.  Patient also has been under a lot of stress 2/2 work.  He also vapes everyday and questionable if smokes marijuana (she said he only took it once but he says he still does but cannot recall when he took it last).  He denies any other drugs.          ***Patient with AMS and cannot recall any events of the day.  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  Per wife, patient was in his usual state of health until this morning when he told her that he was not feeling well.  Reportedly patient tested himself for COVID and was negative.  Wife left the house but then in the afternoon the patient called her saying he could not breathe.  Wife rushed home and found the patient sitting in the car.  Was brought to the bathroom where the patient still had SOB and started to complain of excruciating abdominal pain,  Also noted to be weak and unsteady on his feet.  Wife immediately drove the patient here where he was noted to be disorientated and agitated with triage vitals of /119  HR 70  RR 25, 1000% and T 98.9F.  Labs were mostly unremarkable.  Head CT was negative.  CTA of chest and A/P showed "no aortic dissection and no aortic aneurysm."  Patient was given ativan and labetalol for his agitation and hypertension respectively.   Utox was +opiate (though was taken after he was given morphine).  Patient eventually calmed down and his BP eventually went down to 123/78.  Patient became more lucid and less agitated.  However, patient could not recall any of the events from earlier today.  He said that he abdomen does not hurt at the moment as well.  As per wife, patient takes large dose of Bronkaid which includes ephedrine but cannot say how much he takes currently (though she reports he has taken as many as 100 pills in the past).  He also drinks a lot of caffeine and soda.  Patient also has been under a lot of stress 2/2 work.  He also vapes everyday and questionable if smokes marijuana (she said he only took it once but he says he still does but cannot recall when he took it last).  He denies any other drugs.  Wife reports that they recently came back from [place] last week.  No other sick contacts.  Patient is being admitted to [facility] for hypertensive crisis.

## 2023-09-26 NOTE — H&P ADULT - ASSESSMENT
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   Found to be in hypertensive crisis causing AMS though currently resolving.      Hypertensive crisis - unclear of cause though suspect a combination of Bronkaid, stress, and unknown drug use.  Possibly patient may have also missed his meds.  Responded to labetalol.  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   Found to be in hypertensive crisis causing AMS though currently resolving.      Hypertensive crisis - unclear of cause though suspect a combination of Bronkaid, stress, and unknown drug use.  Possibly patient may have also missed his meds.  Responded to labetalol.  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   Found to be in hypertensive crisis causing AMS though currently resolving.      Hypertensive crisis - unclear of cause though suspect a combination of Bronkaid, stress, and unknown drug use.  Possibly patient may have also missed his meds.  Responded to labetalol.  - admitted to [facility]  - BP currently acceptable  - card consult  - cc consult  - monitor for rebound tachycardia  - ativan ordered PRN   - trend troponins  - monitor for withdrawals from ephedrine  - hold B-blocker 2/2 possibly unopposed alpha from ephedrine    Abdominal pain - seems to have resolved, no etiology found on CT  - monitor for now    HLD/CAD  - cont with statin  - cont with plavix and aspirinn    ADD  - holding atomoxetine and citalopram    Preventive measures  - lovenox for DVT ppx

## 2023-09-26 NOTE — ED PROVIDER NOTE - CLINICAL SUMMARY MEDICAL DECISION MAKING FREE TEXT BOX
Acute shortness of breath with some chest heaviness and lower abdominal discomfort over past 4 to 5 hours.  Will check labs, CT/CTA, IV, blood pressure control, likely admission/transfer

## 2023-09-26 NOTE — ED PROVIDER NOTE - OBJECTIVE STATEMENT
68-year-old male with history of hypertension, CAD status post stents, high cholesterol presents with sudden onset of feeling shortness of breath with some chest heaviness, and lower abdominal discomfort which started around noon today.  Patient states the shortness of breath has been getting worse.  No vomiting or diarrhea.  No recent fall or trauma.  No cough/URI.  No numbness/tingling/focal weakness.  No acute headache.  No aggravating or alleviating factors otherwise noted.  No other acute injury or complaints.  Patient is in his usual state of health recently, no recent dyspnea exertion or easy fatigue.  No other acute chest pains.  Patient previously vaccinated for COVID, no recent exposure.

## 2023-09-26 NOTE — CONSULT NOTE ADULT - SUBJECTIVE AND OBJECTIVE BOX
REASON FOR CONSULT: HTN crisis    CONSULT REQUESTED BY: ED    Patient is a 68y old  Male who presents with a chief complaint of AMS, difficulty breathing -> hypertensive crisis (26 Sep 2023 21:33)      BRIEF HOSPITAL COURSE:  69 y/o M with pmhc of HTN, HLD, CAD with PCI in past presented to ED with acute onset SOB, chest heaviness, abdominal pain, and confusion. Endorses heavily abusing over counter medication bronkaid which contain ephedrine, says he takes up to 50 - 60 pills a day. Initial BP in 220's systolic. Treated with labetalol morphine, ativan with good response. Cardiac enzymes negative. CTA abd/pelvis negative for dissection, acute pathology. CT head negative. ICU consulted for sympathomimetic overdose.,         PAST MEDICAL & SURGICAL HISTORY:  Stented coronary artery        Allergies    No Known Allergies    Intolerances      FAMILY HISTORY:      Review of Systems:  CONSTITUTIONAL: No fever, chills, or fatigue  EYES: No eye pain, visual disturbances, or discharge  ENMT:  No difficulty hearing, tinnitus, vertigo; No sinus or throat pain  NECK: No pain or stiffness  RESPIRATORY: No cough, wheezing, chills or hemoptysis; No shortness of breath  CARDIOVASCULAR: No chest pain, palpitations, dizziness, or leg swelling  GASTROINTESTINAL: No abdominal or epigastric pain. No nausea, vomiting, or hematemesis; No diarrhea or constipation. No melena or hematochezia.  GENITOURINARY: No dysuria, frequency, hematuria, or incontinence  NEUROLOGICAL: + confusion No headaches, memory loss, loss of strength, numbness, or tremors  SKIN: No itching, burning, rashes, or lesions   MUSCULOSKELETAL: No joint pain or swelling; No muscle, back, or extremity pain  PSYCHIATRIC: No depression, anxiety, mood swings, or difficulty sleeping      Medications:    lisinopril 10 milliGRAM(s) Oral daily      acetaminophen     Tablet .. 650 milliGRAM(s) Oral every 6 hours PRN  LORazepam   Injectable 1 milliGRAM(s) IV Push every 4 hours PRN  melatonin 3 milliGRAM(s) Oral at bedtime PRN      aspirin enteric coated 81 milliGRAM(s) Oral daily  clopidogrel Tablet 75 milliGRAM(s) Oral daily  enoxaparin Injectable 40 milliGRAM(s) SubCutaneous every 24 hours    aluminum hydroxide/magnesium hydroxide/simethicone Suspension 30 milliLiter(s) Oral every 4 hours PRN      atorvastatin 40 milliGRAM(s) Oral at bedtime                  ICU Vital Signs Last 24 Hrs  T(C): 37.2 (26 Sep 2023 17:20), Max: 37.2 (26 Sep 2023 17:20)  T(F): 98.9 (26 Sep 2023 17:20), Max: 98.9 (26 Sep 2023 17:20)  HR: 63 (26 Sep 2023 21:00) (63 - 70)  BP: 123/78 (26 Sep 2023 21:00) (123/78 - 233/119)  BP(mean): --  ABP: --  ABP(mean): --  RR: 18 (26 Sep 2023 21:00) (18 - 25)  SpO2: 98% (26 Sep 2023 21:00) (98% - 100%)    O2 Parameters below as of 26 Sep 2023 21:00  Patient On (Oxygen Delivery Method): room air          Vital Signs Last 24 Hrs  T(C): 37.2 (26 Sep 2023 17:20), Max: 37.2 (26 Sep 2023 17:20)  T(F): 98.9 (26 Sep 2023 17:20), Max: 98.9 (26 Sep 2023 17:20)  HR: 63 (26 Sep 2023 21:00) (63 - 70)  BP: 123/78 (26 Sep 2023 21:00) (123/78 - 233/119)  BP(mean): --  RR: 18 (26 Sep 2023 21:00) (18 - 25)  SpO2: 98% (26 Sep 2023 21:00) (98% - 100%)    Parameters below as of 26 Sep 2023 21:00  Patient On (Oxygen Delivery Method): room air            I&O's Detail        LABS:                        17.0   10.21 )-----------( 233      ( 26 Sep 2023 17:08 )             51.3     09-26    136  |  99  |  15  ----------------------------<  108<H>  4.0   |  26  |  1.17    Ca    10.8<H>      26 Sep 2023 17:08    TPro  8.1  /  Alb  4.4  /  TBili  1.2  /  DBili  x   /  AST  35  /  ALT  33  /  AlkPhos  75  09-26      CARDIAC MARKERS ( 26 Sep 2023 17:08 )  x     / x     / 291 U/L / x     / x          CAPILLARY BLOOD GLUCOSE          Urinalysis Basic - ( 26 Sep 2023 17:08 )    Color: x / Appearance: x / SG: x / pH: x  Gluc: 108 mg/dL / Ketone: x  / Bili: x / Urobili: x   Blood: x / Protein: x / Nitrite: x   Leuk Esterase: x / RBC: x / WBC x   Sq Epi: x / Non Sq Epi: x / Bacteria: x      CULTURES:      Physical Examination:    General: No acute distress.      Neuro: A O x3, although searching for words, easily forgetful. CN2-12 intact.    HEENT: Pupils equal, reactive to light.  Symmetric.    PULM: Clear to auscultation bilaterally, no significant sputum production    CVS: Regular rate and rhythm, no murmurs, rubs, or gallops    ABD: Soft, nondistended, nontender, normoactive bowel sounds, no masses    EXT: No edema, nontender    SKIN: Warm and well perfused, no rashes noted.    RADIOLOGY:   < from: CT Angio Abdomen and Pelvis w/ IV Cont (09.26.23 @ 17:29) >  MEDIASTINUM: Enlarged left atrium and left ventricle. Coronary   atherosclerosis. No pericardial effusion. No large mediastinal lymph   nodes.    AIRWAYS, LUNGS, PLEURA: Trachea and mainstem bronchi patent. No   consolidation. No pleural effusion.    ABDOMEN and PELVIS: Multiple left renal hypodense lesions unchanged   compared to 6/27/2022. Abdominal organs otherwise unremarkable.   Unremarkable urinary bladder. Enlarged prostate.    Colonic diverticulosis. No bowel obstruction. No free fluid. No abdominal   or pelvic lymphadenopathy.    BONES: Unremarkable.    SOFT TISSUES: Unremarkable.    IMPRESSION:    No aortic dissection.    No aortic aneurysm.    --- End of Report ---      < end of copied text >    < from: CT Head No Cont (09.26.23 @ 19:07) >  IMPRESSION: Age-appropriate involutional and ischemic gliotic changes. No   obvious hemorrhage. Limited by the presence of intravascular contrast. No   significant change since 6/27/2022.    --- End of Report ---        < end of copied text >

## 2023-09-26 NOTE — PROVIDER CONTACT NOTE (EICU) - RECOMMENDATIONS
as Ephedrine is alpha-adrenergic and beta-adrenergic receptor agonist, will hold of using BB for bp management. use CCB  pt currently s/p Labetalol, morphine and ativan.   Pt bp improved with above  from 233/119 to 159/91  no acute findings on ct head  will recommend to admit to ICU for close bp, nuero watch  neuro checks q 2 hrs  poison control to be called   d/w  bedside acp

## 2023-09-26 NOTE — ED ADULT TRIAGE NOTE - CHIEF COMPLAINT QUOTE
as per wife  " he called me because he could not breath - He has a lot of pain on his lower abdomen " Pt reported not feeling well since this morning - asked to have a Covid test  - ( negative result from a urgent care )

## 2023-09-26 NOTE — ED ADULT NURSE NOTE - OBJECTIVE STATEMENT
pt comes to ed c/o SOB and lower abd pain since earlier today. spouse said he has been abusing the medication "bronkaid"  No vomiting or diarrhea.  No recent fall or trauma.  No cough/URI.  No numbness/tingling/focal weakness.  No acute headache.  No aggravating or alleviating factors otherwise noted.  No other acute injury or complaints.

## 2023-09-26 NOTE — PATIENT PROFILE ADULT - FALL HARM RISK - HARM RISK INTERVENTIONS

## 2023-09-26 NOTE — H&P ADULT - NSHPPHYSICALEXAM_GEN_ALL_CORE
PHYSICAL EXAM:  Vital Signs Last 24 Hrs  T(C): 37.1 (26 Sep 2023 22:05), Max: 37.2 (26 Sep 2023 17:20)  T(F): 98.8 (26 Sep 2023 22:05), Max: 98.9 (26 Sep 2023 17:20)  HR: 75 (26 Sep 2023 22:05) (63 - 75)  BP: 133/78 (26 Sep 2023 22:05) (123/78 - 233/119)  BP(mean): --  RR: 18 (26 Sep 2023 22:05) (18 - 25)  SpO2: 99% (26 Sep 2023 22:05) (98% - 100%)    Parameters below as of 26 Sep 2023 22:05  Patient On (Oxygen Delivery Method): room air    GENERAL:     age appropriate male in NAD  HEAD:     atraumatic, normocephalic  EYES:     EOMI, conjunctiva and sclera clear  RESPIRATORY:     clear to auscultation bilaterally, no rales or rhonchi or wheezing or rubs  CARDIOVASCULAR:     regular rate and rhythm, no murmurs or rubs or gallops, 2+ peripheral pulses  GASTROINTESTINAL:     soft, nontender, nondistended, bowel sounds present  EXTREMITIES:     no clubbing or cyanosis or edema  MUSCULOSKELETAL:     no joint pain or swelling or deformities  NERVOUS SYSTEM:     moves all 4s  SKIN:     no rashes or lesions  PSYCH:     AOx1 (at this time knows his name only and does not know the date or where he is (had to be reminded that he is in the hospital)), able to answer questions appropriately but cannot recall events and keeps asking what happened

## 2023-09-27 DIAGNOSIS — E78.5 HYPERLIPIDEMIA, UNSPECIFIED: ICD-10-CM

## 2023-09-27 PROBLEM — Z95.5 PRESENCE OF CORONARY ANGIOPLASTY IMPLANT AND GRAFT: Chronic | Status: ACTIVE | Noted: 2022-06-27

## 2023-09-27 LAB
ALBUMIN SERPL ELPH-MCNC: 4.1 G/DL — SIGNIFICANT CHANGE UP (ref 3.3–5)
ALP SERPL-CCNC: 58 U/L — SIGNIFICANT CHANGE UP (ref 30–120)
ALT FLD-CCNC: 28 U/L — SIGNIFICANT CHANGE UP (ref 10–60)
ANION GAP SERPL CALC-SCNC: 13 MMOL/L — SIGNIFICANT CHANGE UP (ref 5–17)
AST SERPL-CCNC: 36 U/L — SIGNIFICANT CHANGE UP (ref 10–40)
BASE EXCESS BLDV CALC-SCNC: 4.3 MMOL/L — HIGH (ref -2–3)
BASOPHILS # BLD AUTO: 0.05 K/UL — SIGNIFICANT CHANGE UP (ref 0–0.2)
BASOPHILS NFR BLD AUTO: 0.4 % — SIGNIFICANT CHANGE UP (ref 0–2)
BILIRUB SERPL-MCNC: 1.6 MG/DL — HIGH (ref 0.2–1.2)
BUN SERPL-MCNC: 14 MG/DL — SIGNIFICANT CHANGE UP (ref 7–23)
CALCIUM SERPL-MCNC: 9.9 MG/DL — SIGNIFICANT CHANGE UP (ref 8.4–10.5)
CHLORIDE SERPL-SCNC: 103 MMOL/L — SIGNIFICANT CHANGE UP (ref 96–108)
CO2 SERPL-SCNC: 21 MMOL/L — LOW (ref 22–31)
CREAT SERPL-MCNC: 1.19 MG/DL — SIGNIFICANT CHANGE UP (ref 0.5–1.3)
EGFR: 66 ML/MIN/1.73M2 — SIGNIFICANT CHANGE UP
EOSINOPHIL # BLD AUTO: 0.22 K/UL — SIGNIFICANT CHANGE UP (ref 0–0.5)
EOSINOPHIL NFR BLD AUTO: 1.7 % — SIGNIFICANT CHANGE UP (ref 0–6)
GLUCOSE SERPL-MCNC: 84 MG/DL — SIGNIFICANT CHANGE UP (ref 70–99)
HCO3 BLDV-SCNC: 28 MMOL/L — SIGNIFICANT CHANGE UP (ref 22–29)
HCT VFR BLD CALC: 47.9 % — SIGNIFICANT CHANGE UP (ref 39–50)
HCV AB S/CO SERPL IA: 0.06 S/CO — SIGNIFICANT CHANGE UP (ref 0–0.99)
HCV AB SERPL-IMP: SIGNIFICANT CHANGE UP
HGB BLD-MCNC: 15.6 G/DL — SIGNIFICANT CHANGE UP (ref 13–17)
HOROWITZ INDEX BLDV+IHG-RTO: 21 — SIGNIFICANT CHANGE UP
IMM GRANULOCYTES NFR BLD AUTO: 0.5 % — SIGNIFICANT CHANGE UP (ref 0–0.9)
LYMPHOCYTES # BLD AUTO: 1.27 K/UL — SIGNIFICANT CHANGE UP (ref 1–3.3)
LYMPHOCYTES # BLD AUTO: 9.8 % — LOW (ref 13–44)
MAGNESIUM SERPL-MCNC: 2.2 MG/DL — SIGNIFICANT CHANGE UP (ref 1.6–2.6)
MCHC RBC-ENTMCNC: 29.5 PG — SIGNIFICANT CHANGE UP (ref 27–34)
MCHC RBC-ENTMCNC: 32.6 GM/DL — SIGNIFICANT CHANGE UP (ref 32–36)
MCV RBC AUTO: 90.5 FL — SIGNIFICANT CHANGE UP (ref 80–100)
MONOCYTES # BLD AUTO: 1.31 K/UL — HIGH (ref 0–0.9)
MONOCYTES NFR BLD AUTO: 10.1 % — SIGNIFICANT CHANGE UP (ref 2–14)
NEUTROPHILS # BLD AUTO: 10.06 K/UL — HIGH (ref 1.8–7.4)
NEUTROPHILS NFR BLD AUTO: 77.5 % — HIGH (ref 43–77)
NRBC # BLD: 0 /100 WBCS — SIGNIFICANT CHANGE UP (ref 0–0)
PCO2 BLDV: 42 MMHG — SIGNIFICANT CHANGE UP (ref 42–55)
PH BLDV: 7.44 — HIGH (ref 7.32–7.43)
PHOSPHATE SERPL-MCNC: 3.5 MG/DL — SIGNIFICANT CHANGE UP (ref 2.5–4.5)
PLATELET # BLD AUTO: 212 K/UL — SIGNIFICANT CHANGE UP (ref 150–400)
PO2 BLDV: 92 MMHG — HIGH (ref 25–45)
POTASSIUM SERPL-MCNC: 4.4 MMOL/L — SIGNIFICANT CHANGE UP (ref 3.5–5.3)
POTASSIUM SERPL-SCNC: 4.4 MMOL/L — SIGNIFICANT CHANGE UP (ref 3.5–5.3)
PROT SERPL-MCNC: 7.1 G/DL — SIGNIFICANT CHANGE UP (ref 6–8.3)
RBC # BLD: 5.29 M/UL — SIGNIFICANT CHANGE UP (ref 4.2–5.8)
RBC # FLD: 12.4 % — SIGNIFICANT CHANGE UP (ref 10.3–14.5)
SAO2 % BLDV: 98.6 % — HIGH (ref 67–88)
SODIUM SERPL-SCNC: 137 MMOL/L — SIGNIFICANT CHANGE UP (ref 135–145)
TROPONIN I, HIGH SENSITIVITY RESULT: 9.7 NG/L — SIGNIFICANT CHANGE UP
TSH SERPL-MCNC: 1.26 UIU/ML — SIGNIFICANT CHANGE UP (ref 0.27–4.2)
TSH SERPL-MCNC: 2.25 UIU/ML — SIGNIFICANT CHANGE UP (ref 0.27–4.2)
WBC # BLD: 12.98 K/UL — HIGH (ref 3.8–10.5)
WBC # FLD AUTO: 12.98 K/UL — HIGH (ref 3.8–10.5)

## 2023-09-27 PROCEDURE — 99221 1ST HOSP IP/OBS SF/LOW 40: CPT

## 2023-09-27 PROCEDURE — 99232 SBSQ HOSP IP/OBS MODERATE 35: CPT

## 2023-09-27 RX ORDER — ALPRAZOLAM 0.25 MG
0.25 TABLET ORAL ONCE
Refills: 0 | Status: DISCONTINUED | OUTPATIENT
Start: 2023-09-27 | End: 2023-09-27

## 2023-09-27 RX ORDER — IPRATROPIUM/ALBUTEROL SULFATE 18-103MCG
3 AEROSOL WITH ADAPTER (GRAM) INHALATION EVERY 6 HOURS
Refills: 0 | Status: DISCONTINUED | OUTPATIENT
Start: 2023-09-27 | End: 2023-09-28

## 2023-09-27 RX ORDER — ALPRAZOLAM 0.25 MG
0.5 TABLET ORAL ONCE
Refills: 0 | Status: DISCONTINUED | OUTPATIENT
Start: 2023-09-27 | End: 2023-09-27

## 2023-09-27 RX ADMIN — ATORVASTATIN CALCIUM 40 MILLIGRAM(S): 80 TABLET, FILM COATED ORAL at 21:34

## 2023-09-27 RX ADMIN — LISINOPRIL 10 MILLIGRAM(S): 2.5 TABLET ORAL at 05:58

## 2023-09-27 RX ADMIN — CLOPIDOGREL BISULFATE 75 MILLIGRAM(S): 75 TABLET, FILM COATED ORAL at 11:25

## 2023-09-27 RX ADMIN — Medication 0.25 MILLIGRAM(S): at 21:34

## 2023-09-27 RX ADMIN — Medication 1 MILLIGRAM(S): at 04:47

## 2023-09-27 RX ADMIN — Medication 81 MILLIGRAM(S): at 11:25

## 2023-09-27 RX ADMIN — Medication 0.5 MILLIGRAM(S): at 02:38

## 2023-09-27 NOTE — CONSULT NOTE ADULT - ASSESSMENT
The patient is a 68 year old male with a history of HTN, HL, CAD s/p PCI, ADD, history of drug abuse who presents with AMS, shortness of breath, chest pain in the setting of ephedrine overdose.    Plan:  - ECG with no evidence of ischemia or infarction  - Hypertensive emergency in the setting of ephedrine overdose - now largely resolved  - Cardiac enzymes negative  - CTA chest negative for dissection or aortic pathology  - Received labetalol in ED  - Continue lisinopril 10 mg daily (formulary equivalent)  - Hold metoprolol for the next 24-48 hours as ephedrine washes out  - Can use dual alpha/beta blockade (i.e. labetalol) if BP trends back up  - Continue ativan as needed  - Continue aspirin 81 mg daily  - Continue clopidogrel 75 mg daily  - Continue atorvastatin 40 mg daily

## 2023-09-27 NOTE — BH CONSULTATION LIAISON ASSESSMENT NOTE - CURRENT MEDICATION
MEDICATIONS  (STANDING):  aspirin enteric coated 81 milliGRAM(s) Oral daily  atorvastatin 40 milliGRAM(s) Oral at bedtime  clopidogrel Tablet 75 milliGRAM(s) Oral daily  enoxaparin Injectable 40 milliGRAM(s) SubCutaneous every 24 hours  influenza  Vaccine (HIGH DOSE) 0.7 milliLiter(s) IntraMuscular once  lisinopril 10 milliGRAM(s) Oral daily    MEDICATIONS  (PRN):  acetaminophen     Tablet .. 650 milliGRAM(s) Oral every 6 hours PRN Temp greater or equal to 38C (100.4F), Mild Pain (1 - 3)  albuterol/ipratropium for Nebulization 3 milliLiter(s) Nebulizer every 6 hours PRN Shortness of Breath and/or Wheezing  aluminum hydroxide/magnesium hydroxide/simethicone Suspension 30 milliLiter(s) Oral every 4 hours PRN Dyspepsia  LORazepam   Injectable 1 milliGRAM(s) IV Push every 30 minutes PRN Agitation  LORazepam   Injectable 0.5 milliGRAM(s) IV Push every 1 hour PRN Anxiety  melatonin 3 milliGRAM(s) Oral at bedtime PRN Insomnia

## 2023-09-27 NOTE — CHART NOTE - NSCHARTNOTEFT_GEN_A_CORE
Patient becoming agitated and diaphoretic.  Feeling very uncomfortable and anxious.  Patient takes xanax 0.25mg 4x/day.  However, it was reported that recently he has been taking all of them (total of xanax 1mg) at once at night to help sleep.  Patient may now be going through benzo withdrawal.  Will give xanax 0.5mg x1 now.

## 2023-09-27 NOTE — DIETITIAN INITIAL EVALUATION ADULT - OTHER INFO
Pt is able to provide minimal information regarding diet PTA. He states he follows a regular diet at home, wife helps with food preparation. NKFA/intolerances. Had good appetite PTA with no recent weight changes or weight loss. Admission weight September 2023 was 95.1kg.     In house currently has no N/V/D/C, last BM PTA. No swallowing/chewing difficulties.

## 2023-09-27 NOTE — BH CONSULTATION LIAISON ASSESSMENT NOTE - SOURCE OF INFORMATION
Patient Cryotherapy Text: The wound bed was treated with cryotherapy after the biopsy was performed.

## 2023-09-27 NOTE — CONSULT NOTE ADULT - SUBJECTIVE AND OBJECTIVE BOX
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  Per wife, patient was in his usual state of health until this morning when he told her that he was not feeling well.  Reportedly patient tested himself for COVID and was negative.  Wife left the house but then in the afternoon the patient called her saying he could not breathe.  Wife rushed home and found the patient sitting in the car.  Was brought to the bathroom where the patient still had SOB and started to complain of excruciating abdominal pain,  Also noted to be weak and unsteady on his feet.  Wife immediately drove the patient here where he was noted to be disorientated and agitated with triage vitals of /119  HR 70  RR 25, 1000% and T 98.9F.  Labs were mostly unremarkable.  Head CT was negative.  CTA of chest and A/P showed "no aortic dissection and no aortic aneurysm."  Patient was given ativan and labetalol for his agitation and hypertension respectively.   Utox was +opiate (though was taken after he was given morphine).  Patient eventually calmed down and his BP eventually went down to 123/78.  Patient became more lucid and less agitated.  However, patient could not recall any of the events from earlier today.  He said that he abdomen does not hurt at the moment as well.  As per wife, patient takes large dose of Bronkaid which includes ephedrine but cannot say how much he takes currently (though she reports he has taken as many as 100 pills in the past).  He also drinks a lot of caffeine and soda.  Patient also has been under a lot of stress 2/2 work.  He also vapes everyday and questionable if smokes marijuana (she said he only took it once but he says he still does but cannot recall when he took it last).  He denies any other drugs.  Wife reports that they recently came back from [place] last week.  No other sick contacts.  Patient is being admitted to [facility] for hypertensive crisis.           MEDICATIONS  (STANDING):  aspirin enteric coated 81 milliGRAM(s) Oral daily  atorvastatin 40 milliGRAM(s) Oral at bedtime  clopidogrel Tablet 75 milliGRAM(s) Oral daily  enoxaparin Injectable 40 milliGRAM(s) SubCutaneous every 24 hours  influenza  Vaccine (HIGH DOSE) 0.7 milliLiter(s) IntraMuscular once  lisinopril 10 milliGRAM(s) Oral daily      MEDICATIONS  (PRN):  acetaminophen     Tablet .. 650 milliGRAM(s) Oral every 6 hours PRN Temp greater or equal to 38C (100.4F), Mild Pain (1 - 3)  aluminum hydroxide/magnesium hydroxide/simethicone Suspension 30 milliLiter(s) Oral every 4 hours PRN Dyspepsia  LORazepam   Injectable 1 milliGRAM(s) IV Push every 4 hours PRN HTN/ Tachycardia  melatonin 3 milliGRAM(s) Oral at bedtime PRN Insomnia      Allergies    No Known Allergies    Intolerances    FAMILY HISTORY:  Father  Still living? Unknown  FH: heart disease, Age at diagnosis: Age Unknown    Mother  Still living? Unknown  FH: heart disease, Age at diagnosis: Age Unknown.     Social History:  · Substance use	Yes  · Alcohol use	none, used to drink heavily in the past  · Substance use	vapes constantly  may smoke marijuana as well  · Tobacco use	used to be a heavy smoker, was a 2ppd for about 40+ years, quit about 10 years ago or so  · Social History (marital status, living situation, occupation, and sexual history)	lives with wife  works in theater and as a consultant, works from home as well     Tobacco Screening:  · Core Measure Site	No  · Has the patient used tobacco in the past 30 days?	Unable to assess due to patient's cognitive impairment    Risk Assessment:    Present on Admission:  Deep Venous Thrombosis	no  Pulmonary Embolus	no     HIV Screening:  · In accordance with [place] State law, we offer every patient who comes to our ED an HIV test. Would you like to be tested today?	Unable to answer due to medical condition/unresponsive/etc...      PAST MEDICAL & SURGICAL HISTORY:  Stented coronary artery      ADD (attention deficit disorder)      Hyperlipidemia      Benign prostate hyperplasia      H/O hernia repair      H/O vasectomy          FAMILY HISTORY:  FH: heart disease (Father, Mother)        SOCIAL HISTORY  Smoking History:     REVIEW OF SYSTEMS:    REVIEW OF SYSTEMS      General:	    Skin/Breast:  	  Ophthalmologic:  	  ENMT:	    Respiratory and Thorax:  	  Cardiovascular:	    Gastrointestinal:	    Genitourinary:	    Musculoskeletal:	    Neurological:	    Psychiatric:	    Hematology/Lymphatics:	    Endocrine:	    Allergic/Immunologic:	    Vital Signs Last 24 Hrs  T(C): 37 (27 Sep 2023 04:00), Max: 37.2 (26 Sep 2023 17:20)  T(F): 98.6 (27 Sep 2023 04:00), Max: 98.9 (26 Sep 2023 17:20)  HR: 62 (27 Sep 2023 05:00) (62 - 86)  BP: 106/75 (27 Sep 2023 05:00) (106/75 - 233/119)  BP(mean): 80 (27 Sep 2023 05:00) (73 - 101)  RR: 19 (27 Sep 2023 05:00) (12 - 30)  SpO2: 95% (27 Sep 2023 05:00) (95% - 100%)    Parameters below as of 27 Sep 2023 05:00  Patient On (Oxygen Delivery Method): room air        PHYSICAL EXAMINATION:  PHYSICAL EXAM:      Constitutional:    Eyes:    ENMT:    Neck:    Breasts:    Back:    Respiratory:    Cardiovascular:    Gastrointestinal:    Genitourinary:    Rectal:    Extremities:    Vascular:    Neurological:    Skin:    Lymph Nodes:    Musculoskeletal:    Psychiatric:          LABS:                        15.6   12.98 )-----------( 212      ( 27 Sep 2023 05:32 )             47.9     09-27    137  |  103  |  14  ----------------------------<  84  4.4   |  21<L>  |  1.19    Ca    9.9      27 Sep 2023 05:32    TPro  7.1  /  Alb  4.1  /  TBili  1.6<H>  /  DBili  x   /  AST  36  /  ALT  28  /  AlkPhos  58  09-27      Urinalysis Basic - ( 27 Sep 2023 05:32 )    Color: x / Appearance: x / SG: x / pH: x  Gluc: 84 mg/dL / Ketone: x  / Bili: x / Urobili: x   Blood: x / Protein: x / Nitrite: x   Leuk Esterase: x / RBC: x / WBC x   Sq Epi: x / Non Sq Epi: x / Bacteria: x        CARDIAC MARKERS ( 26 Sep 2023 17:08 )  x     / x     / 291 U/L / x     / x          D-Dimer Assay, Quantitative: <150 ng/mL DDU (09-26-23 @ 17:08)            MICROBIOLOGY:    RADIOLOGY & ADDITIONAL STUDIES:      ACC: [id number] EXAM:  CT BRAIN   ORDERED BY: SHARON FANG     PROCEDURE DATE:  09/26/2023          INTERPRETATION:  Clinical Indication: Altered mental status, hypertension    5mm axial sections of the brain were obtained from base to vertex,   without the intravenous administration of contrast material. Coronal and   sagittal computer generated reconstructed views are available.    Comparison is made with prior CT of 6/27/2022.    There is no significant interval change since the prior exam. Increased   density in the vascular system is consistent with retained contrast from   previous chest CT obtained at 5:22 pm    No abnormal parenchymal or leptomeningeal enhancement is identified.   There is no hemorrhage although the examination is limited by the   presence of intravascular contrast. There has been previous bilateral   lens replacement surgery.    IMPRESSION: Age-appropriate involutional and ischemic gliotic changes. No   obvious hemorrhage. Limited by the presence of intravascular contrast. No   significant change since 6/27/2022.    --- End of Report ---            ERICK LOVETT MD; Attending Radiologist  This document has been electronically signed. 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  Per wife, patient was in his usual state of health until this morning when he told her that he was not feeling well.  Reportedly patient tested himself for COVID and was negative.  Wife left the house but then in the afternoon the patient called her saying he could not breathe.  Wife rushed home and found the patient sitting in the car.  Was brought to the bathroom where the patient still had SOB and started to complain of excruciating abdominal pain,  Also noted to be weak and unsteady on his feet.  Wife immediately drove the patient here where he was noted to be disorientated and agitated with triage vitals of /119  HR 70  RR 25, 1000% and T 98.9F.  Labs were mostly unremarkable.  Head CT was negative.  CTA of chest and A/P showed "no aortic dissection and no aortic aneurysm."  Patient was given ativan and labetalol for his agitation and hypertension respectively.   Utox was +opiate (though was taken after he was given morphine).  Patient eventually calmed down and his BP eventually went down to 123/78.  Patient became more lucid and less agitated.  However, patient could not recall any of the events from earlier today.  He said that he abdomen does not hurt at the moment as well.  As per wife, patient takes large dose of Bronkaid which includes ephedrine but cannot say how much he takes currently (though she reports he has taken as many as 100 pills in the past).  He also drinks a lot of caffeine and soda.  Patient also has been under a lot of stress 2/2 work.  He also vapes everyday and questionable if smokes marijuana (she said he only took it once but he says he still does but cannot recall when he took it last).  He denies any other drugs.  Wife reports that they recently came back from [place] last week.  No other sick contacts.  Patient is being admitted to [facility] for hypertensive crisis.           MEDICATIONS  (STANDING):  aspirin enteric coated 81 milliGRAM(s) Oral daily  atorvastatin 40 milliGRAM(s) Oral at bedtime  clopidogrel Tablet 75 milliGRAM(s) Oral daily  enoxaparin Injectable 40 milliGRAM(s) SubCutaneous every 24 hours  influenza  Vaccine (HIGH DOSE) 0.7 milliLiter(s) IntraMuscular once  lisinopril 10 milliGRAM(s) Oral daily      MEDICATIONS  (PRN):  acetaminophen     Tablet .. 650 milliGRAM(s) Oral every 6 hours PRN Temp greater or equal to 38C (100.4F), Mild Pain (1 - 3)  aluminum hydroxide/magnesium hydroxide/simethicone Suspension 30 milliLiter(s) Oral every 4 hours PRN Dyspepsia  LORazepam   Injectable 1 milliGRAM(s) IV Push every 4 hours PRN HTN/ Tachycardia  melatonin 3 milliGRAM(s) Oral at bedtime PRN Insomnia      Allergies    No Known Allergies    Intolerances    FAMILY HISTORY:  Father  Still living? Unknown  FH: heart disease, Age at diagnosis: Age Unknown    Mother  Still living? Unknown  FH: heart disease, Age at diagnosis: Age Unknown.     Social History:  · Substance use	Yes  · Alcohol use	none, used to drink heavily in the past  · Substance use	vapes constantly  may smoke marijuana as well  · Tobacco use	used to be a heavy smoker, was a 2ppd for about 40+ years, quit about 10 years ago or so  · Social History (marital status, living situation, occupation, and sexual history)	lives with wife  works in theater and as a consultant, works from home as well     Tobacco Screening:  · Core Measure Site	No  · Has the patient used tobacco in the past 30 days?	Unable to assess due to patient's cognitive impairment    Risk Assessment:    Present on Admission:  Deep Venous Thrombosis	no  Pulmonary Embolus	no     HIV Screening:  · In accordance with [place] State law, we offer every patient who comes to our ED an HIV test. Would you like to be tested today?	Unable to answer due to medical condition/unresponsive/etc...      PAST MEDICAL & SURGICAL HISTORY:  Stented coronary artery      ADD (attention deficit disorder)      Hyperlipidemia      Benign prostate hyperplasia      H/O hernia repair      H/O vasectomy          FAMILY HISTORY:  FH: heart disease (Father, Mother)        SOCIAL HISTORY  Smoking History:     REVIEW OF SYSTEMS:    REVIEW OF SYSTEMS      General:	    Skin/Breast:  	  Ophthalmologic:  	  ENMT:	    Respiratory and Thorax:  	  Cardiovascular:	    Gastrointestinal:	    Genitourinary:	    Musculoskeletal:	    Neurological:	    Psychiatric:	    Hematology/Lymphatics:	    Endocrine:	    Allergic/Immunologic:	    Vital Signs Last 24 Hrs  T(C): 37 (27 Sep 2023 04:00), Max: 37.2 (26 Sep 2023 17:20)  T(F): 98.6 (27 Sep 2023 04:00), Max: 98.9 (26 Sep 2023 17:20)  HR: 62 (27 Sep 2023 05:00) (62 - 86)  BP: 106/75 (27 Sep 2023 05:00) (106/75 - 233/119)  BP(mean): 80 (27 Sep 2023 05:00) (73 - 101)  RR: 19 (27 Sep 2023 05:00) (12 - 30)  SpO2: 95% (27 Sep 2023 05:00) (95% - 100%)    Parameters below as of 27 Sep 2023 05:00  Patient On (Oxygen Delivery Method): room air        PHYSICAL EXAMINATION:  PHYSICAL EXAM:  heart s1s2  lung dc BS  head nc  chest symmetric      Constitutional:    Eyes:    ENMT:    Neck:    Breasts:    Back:    Respiratory:    Cardiovascular:    Gastrointestinal:    Genitourinary:    Rectal:    Extremities:    Vascular:    Neurological:    Skin:    Lymph Nodes:    Musculoskeletal:    Psychiatric:          LABS:                        15.6   12.98 )-----------( 212      ( 27 Sep 2023 05:32 )             47.9     09-27    137  |  103  |  14  ----------------------------<  84  4.4   |  21<L>  |  1.19    Ca    9.9      27 Sep 2023 05:32    TPro  7.1  /  Alb  4.1  /  TBili  1.6<H>  /  DBili  x   /  AST  36  /  ALT  28  /  AlkPhos  58  09-27      Urinalysis Basic - ( 27 Sep 2023 05:32 )    Color: x / Appearance: x / SG: x / pH: x  Gluc: 84 mg/dL / Ketone: x  / Bili: x / Urobili: x   Blood: x / Protein: x / Nitrite: x   Leuk Esterase: x / RBC: x / WBC x   Sq Epi: x / Non Sq Epi: x / Bacteria: x        CARDIAC MARKERS ( 26 Sep 2023 17:08 )  x     / x     / 291 U/L / x     / x          D-Dimer Assay, Quantitative: <150 ng/mL DDU (09-26-23 @ 17:08)            MICROBIOLOGY:    RADIOLOGY & ADDITIONAL STUDIES:      ACC: [id number] EXAM:  CT BRAIN   ORDERED BY: SHARON FANG     PROCEDURE DATE:  09/26/2023          INTERPRETATION:  Clinical Indication: Altered mental status, hypertension    5mm axial sections of the brain were obtained from base to vertex,   without the intravenous administration of contrast material. Coronal and   sagittal computer generated reconstructed views are available.    Comparison is made with prior CT of 6/27/2022.    There is no significant interval change since the prior exam. Increased   density in the vascular system is consistent with retained contrast from   previous chest CT obtained at 5:22 pm    No abnormal parenchymal or leptomeningeal enhancement is identified.   There is no hemorrhage although the examination is limited by the   presence of intravascular contrast. There has been previous bilateral   lens replacement surgery.    IMPRESSION: Age-appropriate involutional and ischemic gliotic changes. No   obvious hemorrhage. Limited by the presence of intravascular contrast. No   significant change since 6/27/2022.    --- End of Report ---            ERICK LOVETT MD; Attending Radiologist  This document has been electronically signed. Sep 26 2023  7:10PM

## 2023-09-27 NOTE — DIETITIAN INITIAL EVALUATION ADULT - PERTINENT LABORATORY DATA
09-27    137  |  103  |  14  ----------------------------<  84  4.4   |  21<L>  |  1.19    Ca    9.9      27 Sep 2023 05:32  Phos  3.5     09-27  Mg     2.2     09-27    TPro  7.1  /  Alb  4.1  /  TBili  1.6<H>  /  DBili  x   /  AST  36  /  ALT  28  /  AlkPhos  58  09-27

## 2023-09-27 NOTE — BH CONSULTATION LIAISON ASSESSMENT NOTE - NSBHCHARTREVIEWVS_PSY_A_CORE FT
Vital Signs Last 24 Hrs  T(C): 36.8 (27 Sep 2023 12:13), Max: 37.2 (26 Sep 2023 17:20)  T(F): 98.3 (27 Sep 2023 12:13), Max: 98.9 (26 Sep 2023 17:20)  HR: 60 (27 Sep 2023 13:00) (57 - 86)  BP: 113/93 (27 Sep 2023 13:00) (97/69 - 233/119)  BP(mean): 102 (27 Sep 2023 13:00) (73 - 102)  RR: 14 (27 Sep 2023 13:00) (11 - 30)  SpO2: 97% (27 Sep 2023 13:00) (94% - 100%)    Parameters below as of 27 Sep 2023 08:00  Patient On (Oxygen Delivery Method): room air

## 2023-09-27 NOTE — BH CONSULTATION LIAISON ASSESSMENT NOTE - NSBHCHARTREVIEWLAB_PSY_A_CORE FT
15.6   12.98 )-----------( 212      ( 27 Sep 2023 05:32 )             47.9   09-27    137  |  103  |  14  ----------------------------<  84  4.4   |  21<L>  |  1.19    Ca    9.9      27 Sep 2023 05:32  Phos  3.5     09-27  Mg     2.2     09-27    TPro  7.1  /  Alb  4.1  /  TBili  1.6<H>  /  DBili  x   /  AST  36  /  ALT  28  /  AlkPhos  58  09-27

## 2023-09-27 NOTE — CONSULT NOTE ADULT - ASSESSMENT
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    stimulant use disorder  vape use  nicotine dep  THC use  CAD  HLD  HTN  ADD  JALEESA  AMS    benzo PRN for agitation  NEBS prn for wheeze -   Psych eval  fall prec  assist with needs  oral and skin hygiene  CNS imaging reviewed  NEURO eval  left a message for WIFE

## 2023-09-27 NOTE — BH CONSULTATION LIAISON ASSESSMENT NOTE - HPI (INCLUDE ILLNESS QUALITY, SEVERITY, DURATION, TIMING, CONTEXT, MODIFYING FACTORS, ASSOCIATED SIGNS AND SYMPTOMS)
Patient seen, evaluated and chart reviewed. Patient is a 68-year-old MWM, with no prior psychiatric hospitalizations, history of depression, anxiety and polysubstance dependence, PMH of hypertension, CAD status post stents, high cholesterol presents with sudden onset of feeling shortness of breath with some chest heaviness, and lower abdominal discomfort which started around noon today.  Patient states the shortness of breath has been getting worse.  No vomiting or diarrhea.  No recent fall or trauma.  No cough/URI.  No numbness/tingling/focal weakness.  No acute headache.  No aggravating or alleviating factors otherwise noted.  No other acute injury or complaints.  Patient is in his usual state of health recently, no recent dyspnea exertion or easy fatigue.  No other acute chest pains.  Patient previously vaccinated for COVID, no recent exposure. Patient has reportedly been confused yesterday, appears to be significantly more lucid, although continues to be somewhat disorganized. Patient admits to using marijuana recently and Bronkaid - ephedrine. Currently calm and cooperative, no evidence of psychosis, eric or depression.

## 2023-09-27 NOTE — DIETITIAN INITIAL EVALUATION ADULT - ORAL INTAKE PTA/DIET HISTORY
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  Per wife, patient was in his usual state of health until this morning when he told her that he was not feeling well.  Reportedly patient tested himself for COVID and was negative.  Wife left the house but then in the afternoon the patient called her saying he could not breathe.  Wife rushed home and found the patient sitting in the car.  Was brought to the bathroom where the patient still had SOB and started to complain of excruciating abdominal pain,  Also noted to be weak and unsteady on his feet.  Wife immediately drove the patient here where he was noted to be disorientated and agitated with triage vitals of /119  HR 70  RR 25, 1000% and T 98.9F.  Labs were mostly unremarkable.  Head CT was negative.  CTA of chest and A/P showed "no aortic dissection and no aortic aneurysm."  Patient was given ativan and labetalol for his agitation and hypertension respectively.   Utox was +opiate (though was taken after he was given morphine).  Patient eventually calmed down and his BP eventually went down to 123/78.  Patient became more lucid and less agitated.  However, patient could not recall any of the events from earlier today.  He said that he abdomen does not hurt at the moment as well.  As per wife, patient takes large dose of Bronkaid which includes ephedrine but cannot say how much he takes currently (though she reports he has taken as many as 100 pills in the past).  He also drinks a lot of caffeine and soda.  Patient also has been under a lot of stress 2/2 work.  He also vapes everyday and questionable if smokes marijuana (she said he only took it once but he says he still does but cannot recall when he took it last).  He denies any other drugs.  Wife reports that they recently came back from [place] last week.  No other sick contacts.  Patient is being admitted to [facility] for hypertensive crisis.

## 2023-09-27 NOTE — DIETITIAN INITIAL EVALUATION ADULT - PERTINENT MEDS FT
MEDICATIONS  (STANDING):  aspirin enteric coated 81 milliGRAM(s) Oral daily  atorvastatin 40 milliGRAM(s) Oral at bedtime  clopidogrel Tablet 75 milliGRAM(s) Oral daily  enoxaparin Injectable 40 milliGRAM(s) SubCutaneous every 24 hours  influenza  Vaccine (HIGH DOSE) 0.7 milliLiter(s) IntraMuscular once  lisinopril 10 milliGRAM(s) Oral daily    MEDICATIONS  (PRN):  acetaminophen     Tablet .. 650 milliGRAM(s) Oral every 6 hours PRN Temp greater or equal to 38C (100.4F), Mild Pain (1 - 3)  albuterol/ipratropium for Nebulization 3 milliLiter(s) Nebulizer every 6 hours PRN Shortness of Breath and/or Wheezing  aluminum hydroxide/magnesium hydroxide/simethicone Suspension 30 milliLiter(s) Oral every 4 hours PRN Dyspepsia  LORazepam   Injectable 0.5 milliGRAM(s) IV Push every 1 hour PRN Anxiety  LORazepam   Injectable 1 milliGRAM(s) IV Push every 30 minutes PRN Agitation  melatonin 3 milliGRAM(s) Oral at bedtime PRN Insomnia

## 2023-09-27 NOTE — CONSULT NOTE ADULT - SUBJECTIVE AND OBJECTIVE BOX
History of Present Illness: The patient is a 68 year old male with a history of HTN, HL, CAD s/p PCI, ADD, history of drug abuse who presents with AMS, shortness of breath, chest pain. The patient is a poor historian. He has had worsening shortness of breath. He has had chest and abdominal pain. He reportedly took many pills of ephedrine (which he had done before).    Past Medical/Surgical History:  HTN, HL, CAD s/p PCI, ADD, history of drug abuse    Medications:  Home Medications:  ALPRAZolam 0.25 mg oral tablet: orally 4 times a day, As Needed (26 Sep 2023 18:04)  aspirin 81 mg oral tablet: 1 tab(s) orally (26 Sep 2023 21:30)  atomoxetine 80 mg oral capsule: 1 cap(s) orally (26 Sep 2023 20:47)  Bronkaid: Pt states takes Bronkaid a box a day for fun. Unprescribed.  (26 Sep 2023 18:04)  citalopram 40 mg oral tablet:  (26 Sep 2023 18:04)  Lipitor 40 mg oral tablet: 1 tab(s) orally once a day (26 Sep 2023 18:04)  Metoprolol Succinate ER 25 mg oral tablet, extended release: 1 tab(s) orally once a day (26 Sep 2023 18:04)  Plavix 75 mg oral tablet: 1 tab(s) orally once a day (26 Sep 2023 18:04)  ramipril 2.5 mg oral capsule: 1 cap(s) orally once a day (26 Sep 2023 18:04)  Strattera 80 mg oral capsule: 1 cap(s) orally once a day (in the morning) (26 Sep 2023 18:04)      Family History: Non-contributory family history of premature cardiovascular atherosclerotic disease    Social History: No tobacco, alcohol or drug use    Review of Systems:  General: No fevers, chills, weight gain  Skin: No rashes, color changes  Cardiovascular: No chest pain, orthopnea  Respiratory: No shortness of breath, cough  Gastrointestinal: No nausea, abdominal pain  Genitourinary: No incontinence, pain with urination  Musculoskeletal: No pain, swelling, decreased range of motion  Neurological: No headache, weakness  Psychiatric: No depression, anxiety  Endocrine: No weight gain, increased thirst  All other systems are comprehensively negative.    Physical Exam:  Vitals:        Vital Signs Last 24 Hrs  T(C): 37 (27 Sep 2023 04:00), Max: 37.2 (26 Sep 2023 17:20)  T(F): 98.6 (27 Sep 2023 04:00), Max: 98.9 (26 Sep 2023 17:20)  HR: 59 (27 Sep 2023 07:00) (59 - 86)  BP: 101/79 (27 Sep 2023 07:00) (101/79 - 233/119)  BP(mean): 86 (27 Sep 2023 07:00) (73 - 101)  RR: 18 (27 Sep 2023 07:00) (12 - 30)  SpO2: 97% (27 Sep 2023 07:00) (94% - 100%)  Parameters below as of 27 Sep 2023 05:00  Patient On (Oxygen Delivery Method): room air  General: NAD  HEENT: MMM  Neck: No JVD, no carotid bruit  Lungs: CTAB  CV: RRR, nl S1/S2, no M/R/G  Abdomen: S/NT/ND, +BS  Extremities: No LE edema, no cyanosis  Neuro: AAOx3, non-focal  Skin: No rash    Labs:                        15.6   12.98 )-----------( 212      ( 27 Sep 2023 05:32 )             47.9     09-27    137  |  103  |  14  ----------------------------<  84  4.4   |  21<L>  |  1.19    Ca    9.9      27 Sep 2023 05:32  Phos  3.5     09-27  Mg     2.2     09-27    TPro  7.1  /  Alb  4.1  /  TBili  1.6<H>  /  DBili  x   /  AST  36  /  ALT  28  /  AlkPhos  58  09-27    CARDIAC MARKERS ( 26 Sep 2023 17:08 )  x     / x     / 291 U/L / x     / x              ECG/Telemetry: NSR, LAD, incomplete RBBB

## 2023-09-28 ENCOUNTER — TRANSCRIPTION ENCOUNTER (OUTPATIENT)
Age: 68
End: 2023-09-28

## 2023-09-28 VITALS
TEMPERATURE: 98 F | SYSTOLIC BLOOD PRESSURE: 98 MMHG | RESPIRATION RATE: 16 BRPM | OXYGEN SATURATION: 97 % | HEART RATE: 67 BPM | DIASTOLIC BLOOD PRESSURE: 62 MMHG

## 2023-09-28 LAB
ANION GAP SERPL CALC-SCNC: 7 MMOL/L — SIGNIFICANT CHANGE UP (ref 5–17)
BUN SERPL-MCNC: 18 MG/DL — SIGNIFICANT CHANGE UP (ref 7–23)
CALCIUM SERPL-MCNC: 9.6 MG/DL — SIGNIFICANT CHANGE UP (ref 8.4–10.5)
CHLORIDE SERPL-SCNC: 103 MMOL/L — SIGNIFICANT CHANGE UP (ref 96–108)
CO2 SERPL-SCNC: 27 MMOL/L — SIGNIFICANT CHANGE UP (ref 22–31)
CREAT SERPL-MCNC: 0.95 MG/DL — SIGNIFICANT CHANGE UP (ref 0.5–1.3)
EGFR: 86 ML/MIN/1.73M2 — SIGNIFICANT CHANGE UP
GLUCOSE SERPL-MCNC: 89 MG/DL — SIGNIFICANT CHANGE UP (ref 70–99)
HCT VFR BLD CALC: 46.6 % — SIGNIFICANT CHANGE UP (ref 39–50)
HGB BLD-MCNC: 15.6 G/DL — SIGNIFICANT CHANGE UP (ref 13–17)
MCHC RBC-ENTMCNC: 30.2 PG — SIGNIFICANT CHANGE UP (ref 27–34)
MCHC RBC-ENTMCNC: 33.5 GM/DL — SIGNIFICANT CHANGE UP (ref 32–36)
MCV RBC AUTO: 90.3 FL — SIGNIFICANT CHANGE UP (ref 80–100)
NRBC # BLD: 0 /100 WBCS — SIGNIFICANT CHANGE UP (ref 0–0)
PLATELET # BLD AUTO: 182 K/UL — SIGNIFICANT CHANGE UP (ref 150–400)
POTASSIUM SERPL-MCNC: 3.8 MMOL/L — SIGNIFICANT CHANGE UP (ref 3.5–5.3)
POTASSIUM SERPL-SCNC: 3.8 MMOL/L — SIGNIFICANT CHANGE UP (ref 3.5–5.3)
RBC # BLD: 5.16 M/UL — SIGNIFICANT CHANGE UP (ref 4.2–5.8)
RBC # FLD: 12.4 % — SIGNIFICANT CHANGE UP (ref 10.3–14.5)
SODIUM SERPL-SCNC: 137 MMOL/L — SIGNIFICANT CHANGE UP (ref 135–145)
WBC # BLD: 7.63 K/UL — SIGNIFICANT CHANGE UP (ref 3.8–10.5)
WBC # FLD AUTO: 7.63 K/UL — SIGNIFICANT CHANGE UP (ref 3.8–10.5)

## 2023-09-28 PROCEDURE — 93005 ELECTROCARDIOGRAM TRACING: CPT

## 2023-09-28 PROCEDURE — 36415 COLL VENOUS BLD VENIPUNCTURE: CPT

## 2023-09-28 PROCEDURE — 80307 DRUG TEST PRSMV CHEM ANLYZR: CPT

## 2023-09-28 PROCEDURE — 71275 CT ANGIOGRAPHY CHEST: CPT | Mod: MA

## 2023-09-28 PROCEDURE — 96361 HYDRATE IV INFUSION ADD-ON: CPT

## 2023-09-28 PROCEDURE — 83735 ASSAY OF MAGNESIUM: CPT

## 2023-09-28 PROCEDURE — 85025 COMPLETE CBC W/AUTO DIFF WBC: CPT

## 2023-09-28 PROCEDURE — 82550 ASSAY OF CK (CPK): CPT

## 2023-09-28 PROCEDURE — 85379 FIBRIN DEGRADATION QUANT: CPT

## 2023-09-28 PROCEDURE — 0225U NFCT DS DNA&RNA 21 SARSCOV2: CPT

## 2023-09-28 PROCEDURE — 84443 ASSAY THYROID STIM HORMONE: CPT

## 2023-09-28 PROCEDURE — 84100 ASSAY OF PHOSPHORUS: CPT

## 2023-09-28 PROCEDURE — 71045 X-RAY EXAM CHEST 1 VIEW: CPT

## 2023-09-28 PROCEDURE — 84484 ASSAY OF TROPONIN QUANT: CPT

## 2023-09-28 PROCEDURE — 80053 COMPREHEN METABOLIC PANEL: CPT

## 2023-09-28 PROCEDURE — 96375 TX/PRO/DX INJ NEW DRUG ADDON: CPT

## 2023-09-28 PROCEDURE — 96374 THER/PROPH/DIAG INJ IV PUSH: CPT | Mod: XU

## 2023-09-28 PROCEDURE — 85027 COMPLETE CBC AUTOMATED: CPT

## 2023-09-28 PROCEDURE — 99291 CRITICAL CARE FIRST HOUR: CPT | Mod: 25

## 2023-09-28 PROCEDURE — 80048 BASIC METABOLIC PNL TOTAL CA: CPT

## 2023-09-28 PROCEDURE — 70450 CT HEAD/BRAIN W/O DYE: CPT | Mod: MA

## 2023-09-28 PROCEDURE — 93306 TTE W/DOPPLER COMPLETE: CPT

## 2023-09-28 PROCEDURE — 82803 BLOOD GASES ANY COMBINATION: CPT

## 2023-09-28 PROCEDURE — 74174 CTA ABD&PLVS W/CONTRAST: CPT | Mod: MA

## 2023-09-28 PROCEDURE — 99239 HOSP IP/OBS DSCHRG MGMT >30: CPT

## 2023-09-28 PROCEDURE — 86803 HEPATITIS C AB TEST: CPT

## 2023-09-28 RX ORDER — METOPROLOL TARTRATE 50 MG
1 TABLET ORAL
Qty: 0 | Refills: 0 | DISCHARGE

## 2023-09-28 RX ORDER — ASPIRIN/CALCIUM CARB/MAGNESIUM 324 MG
1 TABLET ORAL
Qty: 0 | Refills: 0 | DISCHARGE

## 2023-09-28 RX ORDER — ATOMOXETINE HYDROCHLORIDE 10 MG/1
1 CAPSULE ORAL
Refills: 0 | DISCHARGE

## 2023-09-28 RX ADMIN — ENOXAPARIN SODIUM 40 MILLIGRAM(S): 100 INJECTION SUBCUTANEOUS at 06:03

## 2023-09-28 RX ADMIN — LISINOPRIL 10 MILLIGRAM(S): 2.5 TABLET ORAL at 06:01

## 2023-09-28 NOTE — CARE COORDINATION ASSESSMENT. - LIVING ARRANGEMENTS, PROFILE
house Thalidomide Counseling: I discussed with the patient the risks of thalidomide including but not limited to birth defects, anxiety, weakness, chest pain, dizziness, cough and severe allergy.

## 2023-09-28 NOTE — DISCHARGE NOTE PROVIDER - HOSPITAL COURSE
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  Admitted for hypertensive crisis with PRES/encephalopathy due to ephedrine overdose. BP improved with labetalol, morphine, holding of ephedrine. Now mentally back at baseline, discussed with psychiatry, cleared for DC home, Needs follow up with PCP, outpatient psychiatrist regarding substance abuse and outpatient psych meds. Can resume other home medoications

## 2023-09-28 NOTE — PROGRESS NOTE ADULT - ASSESSMENT
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    stimulant use disorder  vape use  nicotine dep  THC use  CAD  HLD  HTN  ADD  JALEESA  AMS    vs noted  on RA  labs reviewed  dc planning  JALEESA - f/u as outpatient - Dr Edgar Frank - [facility] outpatient addiction medicine services    benzo PRN for agitation  NEBS prn for wheeze -   Psych eval reviewed  fall prec  assist with needs  oral and skin hygiene  CNS imaging reviewed  NEURO eval  left a message for WIFE
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   Found to be in hypertensive crisis causing AMS though currently resolving.      Hypertensive crisis, with encephalopathy likely multifactorial from medications and PRES - .  - admitted to SPCU, BP now stable  - cardio, pulm psych evals noted  - ativan ordered PRN   - hold B-blocker 2/2 possibly unopposed alpha from ephedrine, would hold on DC.  - continue lisinopril    Abdominal pain - seems to have resolved, no etiology found on CT  - may have been due to urinary retention in setting of ephedrine use  - DC hanson, trial of void    HLD/CAD  - cont with statin  - cont with plavix and aspirin    ADD  - holding atomoxetine and citalopram    Preventive measures  - lovenox for DVT ppx    possible dc tomorrow if stable  
The patient is a 68 year old male with a history of HTN, HL, CAD s/p PCI, ADD, history of drug abuse who presents with AMS, shortness of breath, chest pain in the setting of ephedrine overdose.    Plan:  - ECG with no evidence of ischemia or infarction  - Hypertensive emergency in the setting of ephedrine overdose - now largely resolved  - Cardiac enzymes negative  - CTA chest negative for dissection or aortic pathology  - Received labetalol in ED  - Continue lisinopril 10 mg daily (formulary equivalent)  - Resume metoprolol if BP trends up or on discharge  - Continue ativan as needed  - Continue aspirin 81 mg daily  - Continue clopidogrel 75 mg daily  - Continue atorvastatin 40 mg daily
68M PMH of HTN, HLD, CAD with PCI is admitted to ICU for active management of     HTN Emergency   Sympathomimetic overdose     - Alert and oriented, anxiety; will c/w PRN lorazepam especially in context of sympathomimetic overdose  - Hemodynamically improved on lisinopril, labetalol PRN.  C/w ASA/statin.    - Saturating well on RA.  Aggressive chest PT and IS 10x/H while awake.  Holding all AVNB  - Tolerating PO diet, No indication for SUP  - UOP preserved.  Replete electrolytes PRN.  Mcleod discontinued  - No indication for antibiotics at this time.  Monitor for fever and trend WBC  - Will continue close monitoring in SPCU.  Full code

## 2023-09-28 NOTE — CARE COORDINATION ASSESSMENT. - OTHER PERTINENT DISCHARGE PLANNING INFORMATION:
SW met with the pt who could not recall why he was admitted. 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 He admits to using cocaine and vicadin in the past (20 yrs ago), and currently takes ephedrine to stay awake and alert. He admits to taking 60 pills a day. Pt reported that he sees a psychiatrist who prescribes him xanax and straterra and suffers from depression and anxiety. He went for inpt drug Tx when he was 16 yrs old and is declining any form of Tx at this time. No further SW intervention required.

## 2023-09-28 NOTE — DISCHARGE NOTE NURSING/CASE MANAGEMENT/SOCIAL WORK - PATIENT PORTAL LINK FT
You can access the FollowMyHealth Patient Portal offered by [facility] by registering at the following website: [email or web address]. By joining [employer]’s FollowMyHealth portal, you will also be able to view your health information using other applications (apps) compatible with our system.

## 2023-09-28 NOTE — DISCHARGE NOTE PROVIDER - PROVIDER TOKENS
PROVIDER:[TOKEN:[[id number]],FOLLOWUP:[2 weeks]],PROVIDER:[TOKEN:[[id number]],FOLLOWUP:[1 week]]

## 2023-09-28 NOTE — DISCHARGE NOTE PROVIDER - ATTENDING DISCHARGE PHYSICAL EXAMINATION:
VSS  CONSTITUTIONAL: Well-developed; well-nourished; in no acute distress.  SKIN: Skin exam is warm and dry, no acute rash.  HEAD: Normocephalic; atraumatic.  EYES: PERRL, EOM intact; conjunctiva and sclera clear.  ENT: No nasal discharge; airway clear. TMs clear.  NECK: Supple; non tender.  CARD: S1, S2 normal; no murmurs, gallops, or rubs. Regular rate and rhythm.  RESP: No wheezes, rales or rhonchi.  ABD: Normal bowel sounds; soft; non-distended; non-tender; no hepatosplenomegaly.  EXT: Normal ROM. No clubbing, cyanosis or edema.  LYMPH: No acute cervical adenopathy.  NEURO: Alert, oriented. Grossly unremarkable. No focal deficits.  PSYCH: Cooperative, appropriate.

## 2023-09-28 NOTE — DISCHARGE NOTE PROVIDER - CARE PROVIDER_API CALL
Dong Abraham  Internal Medicine  [address] [place] [address]  Phone: [phone]  Fax: [phone]  Follow Up Time: 2 weeks    Jose Roberto Arevalo  [address] [place] [address]  Phone: [phone]  Follow Up Time: 1 week

## 2023-09-28 NOTE — CARE COORDINATION ASSESSMENT. - NSPASTMEDSURGHISTORY_GEN_ALL_CORE_FT
PAST MEDICAL & SURGICAL HISTORY:  Stented coronary artery      Benign prostate hyperplasia      Hyperlipidemia      ADD (attention deficit disorder)      H/O vasectomy      H/O hernia repair

## 2023-09-28 NOTE — CARE COORDINATION ASSESSMENT. - NSCAREPROVIDERS_GEN_ALL_CORE_FT
CARE PROVIDERS:  Accepting Physician: Jermaine Cooper  Access Services: Ben Fitzpatrick  Administration: Lynette Macario  Administration: Adamaris Reese  Admitting: Jermaine Cooper  Attending: Jermaine Cooper  Consultant: Rohan Butt  Consultant: Ari Tobias  Consultant: Bunny Rock  Consultant: Richmond Gale  Consultant: Weil, Patricia  Consultant: Cristhian Tong  Consultant: Danielito Pedroza  Consultant: Edvin Garcia  Covering Team: Rafa Paulino  Covering Team: Jermaine Cooper  ED Attending: Jose David Valdez  ED Nurse: Lalo Kauffman  Infection Control: Kika Farrar  Nurse: Karime Dubon  Nurse: Tabby Oneill  Ordered: ServiceAccount, [facility] CampusMLM  Ordered: Doctor, Unknown  PCA/Nursing Assistant: Patience Reyes  PCA/Nursing Assistant: Mikhail Zavala  PCA/Nursing Assistant: Elle Benton  Registered Dietitian: Que Woodall  Respiratory Therapy: Hayder Dan  : Catie Martínez  Team: DERIC  Hospitalists, Team  UR// Supp. Assoc.: Emily Celeste

## 2023-09-28 NOTE — DISCHARGE NOTE PROVIDER - NSDCMRMEDTOKEN_GEN_ALL_CORE_FT
ALPRAZolam 0.25 mg oral tablet: orally 4 times a day, As Needed  aspirin 81 mg oral tablet: 1 tab(s) orally once a day  citalopram 40 mg oral tablet:   Lipitor 40 mg oral tablet: 1 tab(s) orally once a day  Plavix 75 mg oral tablet: 1 tab(s) orally once a day  ramipril 2.5 mg oral capsule: 1 cap(s) orally once a day  Strattera 80 mg oral capsule: 1 cap(s) orally once a day (in the morning)

## 2023-09-28 NOTE — DISCHARGE NOTE PROVIDER - NSDCCPCAREPLAN_GEN_ALL_CORE_FT
PRINCIPAL DISCHARGE DIAGNOSIS  Diagnosis: Hypertensive urgency  Assessment and Plan of Treatment: 2/2 ephedrine overdose. follow up with pcp in 2 weeks      SECONDARY DISCHARGE DIAGNOSES  Diagnosis: Shortness of breath  Assessment and Plan of Treatment:     Diagnosis: Ephedrine abuse  Assessment and Plan of Treatment: follow up with psychiatry in 1 week

## 2023-09-28 NOTE — PROGRESS NOTE ADULT - SUBJECTIVE AND OBJECTIVE BOX
Significant recent/past 24 hr events:  - Better BP control achieved  - Diaphoretic with respiratory distress overnight; started on lorazepam PRN with good effect  - No other acute issues    Subjective:    Review of Systems         [x ] A ten-point review of systems was otherwise negative except as noted.  [ ] Due to altered mental status/intubation, subjective information were not able to be obtained from the patient. History was obtained, to the extent possible, from review of the chart and collateral sources of information.      Patient is a 68y old  Male who presents with a chief complaint of Hypertensive urgency         (27 Sep 2023 14:46)    HPI:  ***Patient with AMS and cannot recall any events of the day.  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  Per wife, patient was in his usual state of health until this morning when he told her that he was not feeling well.  Reportedly patient tested himself for COVID and was negative.  Wife left the house but then in the afternoon the patient called her saying he could not breathe.  Wife rushed home and found the patient sitting in the car.  Was brought to the bathroom where the patient still had SOB and started to complain of excruciating abdominal pain,  Also noted to be weak and unsteady on his feet.  Wife immediately drove the patient here where he was noted to be disorientated and agitated with triage vitals of /119  HR 70  RR 25, 1000% and T 98.9F.  Labs were mostly unremarkable.  Head CT was negative.  CTA of chest and A/P showed "no aortic dissection and no aortic aneurysm."  Patient was given ativan and labetalol for his agitation and hypertension respectively.   Utox was +opiate (though was taken after he was given morphine).  Patient eventually calmed down and his BP eventually went down to 123/78.  Patient became more lucid and less agitated.  However, patient could not recall any of the events from earlier today.  He said that he abdomen does not hurt at the moment as well.  As per wife, patient takes large dose of Bronkaid which includes ephedrine but cannot say how much he takes currently (though she reports he has taken as many as 100 pills in the past).  He also drinks a lot of caffeine and soda.  Patient also has been under a lot of stress 2/2 work.  He also vapes everyday and questionable if smokes marijuana (she said he only took it once but he says he still does but cannot recall when he took it last).  He denies any other drugs.  Wife reports that they recently came back from [place] last week.  No other sick contacts.  Patient is being admitted to [facility] for hypertensive crisis.            (26 Sep 2023 21:33)    PAST MEDICAL & SURGICAL HISTORY:  Stented coronary artery      ADD (attention deficit disorder)      Hyperlipidemia      Benign prostate hyperplasia      H/O hernia repair      H/O vasectomy        FAMILY HISTORY:  FH: heart disease (Father, Mother)        Vitals   ICU Vital Signs Last 24 Hrs  T(C): 36.4 (27 Sep 2023 15:18), Max: 37.1 (26 Sep 2023 22:05)  T(F): 97.6 (27 Sep 2023 15:18), Max: 98.8 (26 Sep 2023 22:05)  HR: 56 (27 Sep 2023 18:00) (56 - 86)  BP: 122/82 (27 Sep 2023 18:00) (97/69 - 142/86)  BP(mean): 95 (27 Sep 2023 18:00) (73 - 102)  ABP: --  ABP(mean): --  RR: 20 (27 Sep 2023 18:00) (11 - 30)  SpO2: 98% (27 Sep 2023 18:00) (94% - 100%)    O2 Parameters below as of 27 Sep 2023 16:00  Patient On (Oxygen Delivery Method): room air            Physical Exam:   Constitutional: NAD, well-groomed, well-developed  HEENT: PERRLA, EOMI, no drainage or redness  Neck: supple,  No JVD, Trachea midline  Back: Normal spine flexure, No CVA tenderness, No deformity or limitation of movement  Respiratory: Breath Sounds equal & clear bilaterally to auscultation, no accessory muscle use noted  Cardiovascular: Regular rate, regular rhythm, normal S1, S2; no murmurs or rub  Gastrointestinal: Soft, non-tender, non distended, no hepatosplenomegaly, normal bowel sounds  Extremities: MUÑOZ x 4, no peripheral edema, no cyanosis, no clubbing   Vascular: Equal and normal pulses: 2+ peripheral pulses throughout  Neurological: A+O x 3; speech clear and intact; no sensory, motor  deficits, normal reflexes  Psychiatric: calm, normal mood, normal affect  Musculoskeletal: No joint swelling or deformity; no limitation of movement  Skin: warm, dry, well perfused, no rashes    VENT SETTINGS         I&O's Detail    26 Sep 2023 07:01  -  27 Sep 2023 07:00  --------------------------------------------------------  IN:    Oral Fluid: 400 mL  Total IN: 400 mL    OUT:    Voided (mL): 2200 mL  Total OUT: 2200 mL    Total NET: -1800 mL      27 Sep 2023 07:01  -  27 Sep 2023 19:13  --------------------------------------------------------  IN:    Oral Fluid: 360 mL  Total IN: 360 mL    OUT:    Indwelling Catheter - Urethral (mL): 450 mL    Voided (mL): 150 mL  Total OUT: 600 mL    Total NET: -240 mL          LABS                        15.6   12.98 )-----------( 212      ( 27 Sep 2023 05:32 )             47.9     09-27    137  |  103  |  14  ----------------------------<  84  4.4   |  21<L>  |  1.19    Ca    9.9      27 Sep 2023 05:32  Phos  3.5     09-27  Mg     2.2     09-27    TPro  7.1  /  Alb  4.1  /  TBili  1.6<H>  /  DBili  x   /  AST  36  /  ALT  28  /  AlkPhos  58  09-27    LIVER FUNCTIONS - ( 27 Sep 2023 05:32 )  Alb: 4.1 g/dL / Pro: 7.1 g/dL / ALK PHOS: 58 U/L / ALT: 28 U/L / AST: 36 U/L / GGT: x                   Urinalysis Basic - ( 27 Sep 2023 05:32 )    Color: x / Appearance: x / SG: x / pH: x  Gluc: 84 mg/dL / Ketone: x  / Bili: x / Urobili: x   Blood: x / Protein: x / Nitrite: x   Leuk Esterase: x / RBC: x / WBC x   Sq Epi: x / Non Sq Epi: x / Bacteria: x            MEDICATIONS  (STANDING):  aspirin enteric coated 81 milliGRAM(s) Oral daily  atorvastatin 40 milliGRAM(s) Oral at bedtime  clopidogrel Tablet 75 milliGRAM(s) Oral daily  enoxaparin Injectable 40 milliGRAM(s) SubCutaneous every 24 hours  influenza  Vaccine (HIGH DOSE) 0.7 milliLiter(s) IntraMuscular once  lisinopril 10 milliGRAM(s) Oral daily    MEDICATIONS  (PRN):  acetaminophen     Tablet .. 650 milliGRAM(s) Oral every 6 hours PRN Temp greater or equal to 38C (100.4F), Mild Pain (1 - 3)  albuterol/ipratropium for Nebulization 3 milliLiter(s) Nebulizer every 6 hours PRN Shortness of Breath and/or Wheezing  aluminum hydroxide/magnesium hydroxide/simethicone Suspension 30 milliLiter(s) Oral every 4 hours PRN Dyspepsia  LORazepam   Injectable 1 milliGRAM(s) IV Push every 30 minutes PRN Agitation  LORazepam   Injectable 0.5 milliGRAM(s) IV Push every 1 hour PRN Anxiety  melatonin 3 milliGRAM(s) Oral at bedtime PRN Insomnia      Allergies:  No Known Allergies      
Chief Complaint: AMS, shortness of breath    Interval Events: No events overnight.    Review of Systems:  General: No fevers, chills, weight gain  Skin: No rashes, color changes  Cardiovascular: No chest pain, orthopnea  Respiratory: No shortness of breath, cough  Gastrointestinal: No nausea, abdominal pain  Genitourinary: No incontinence, pain with urination  Musculoskeletal: No pain, swelling, decreased range of motion  Neurological: No headache, weakness  Psychiatric: No depression, anxiety  Endocrine: No weight gain, increased thirst  All other systems are comprehensively negative.    Physical Exam:  Vitals:        Vital Signs Last 24 Hrs  T(C): 36.7 (28 Sep 2023 07:35), Max: 36.8 (27 Sep 2023 12:13)  T(F): 98 (28 Sep 2023 07:35), Max: 98.3 (27 Sep 2023 12:13)  HR: 61 (28 Sep 2023 06:00) (56 - 74)  BP: 120/76 (28 Sep 2023 06:00) (92/64 - 128/82)  BP(mean): 88 (28 Sep 2023 06:00) (71 - 102)  RR: 16 (28 Sep 2023 06:00) (11 - 23)  SpO2: 98% (28 Sep 2023 06:00) (94% - 100%)  Parameters below as of 28 Sep 2023 06:00  Patient On (Oxygen Delivery Method): room air  General: NAD  HEENT: MMM  Neck: No JVD, no carotid bruit  Lungs: CTAB  CV: RRR, nl S1/S2, no M/R/G  Abdomen: S/NT/ND, +BS  Extremities: No LE edema, no cyanosis  Neuro: AAOx3, non-focal  Skin: No rash    Labs:                        15.6   7.63  )-----------( 182      ( 28 Sep 2023 06:00 )             46.6     09-28    137  |  103  |  18  ----------------------------<  89  3.8   |  27  |  0.95    Ca    9.6      28 Sep 2023 06:00  Phos  3.5     09-27  Mg     2.2     09-27    TPro  7.1  /  Alb  4.1  /  TBili  1.6<H>  /  DBili  x   /  AST  36  /  ALT  28  /  AlkPhos  58  09-27    CARDIAC MARKERS ( 26 Sep 2023 17:08 )  x     / x     / 291 U/L / x     / x              ECG/Telemetry: Sinus rhythm
Date/Time Patient Seen:  		  Referring MD:   Data Reviewed	       Patient is a 68y old  Male who presents with a chief complaint of AMS, difficulty breathing -> hypertensive crisis (27 Sep 2023 19:13)      Subjective/HPI     PAST MEDICAL & SURGICAL HISTORY:  Stented coronary artery    ADD (attention deficit disorder)    Hyperlipidemia    Benign prostate hyperplasia    H/O hernia repair    H/O vasectomy          Medication list         MEDICATIONS  (STANDING):  aspirin enteric coated 81 milliGRAM(s) Oral daily  atorvastatin 40 milliGRAM(s) Oral at bedtime  clopidogrel Tablet 75 milliGRAM(s) Oral daily  enoxaparin Injectable 40 milliGRAM(s) SubCutaneous every 24 hours  influenza  Vaccine (HIGH DOSE) 0.7 milliLiter(s) IntraMuscular once  lisinopril 10 milliGRAM(s) Oral daily    MEDICATIONS  (PRN):  acetaminophen     Tablet .. 650 milliGRAM(s) Oral every 6 hours PRN Temp greater or equal to 38C (100.4F), Mild Pain (1 - 3)  albuterol/ipratropium for Nebulization 3 milliLiter(s) Nebulizer every 6 hours PRN Shortness of Breath and/or Wheezing  aluminum hydroxide/magnesium hydroxide/simethicone Suspension 30 milliLiter(s) Oral every 4 hours PRN Dyspepsia  LORazepam   Injectable 1 milliGRAM(s) IV Push every 30 minutes PRN Agitation  LORazepam   Injectable 0.5 milliGRAM(s) IV Push every 1 hour PRN Anxiety  melatonin 3 milliGRAM(s) Oral at bedtime PRN Insomnia         Vitals log        ICU Vital Signs Last 24 Hrs  T(C): 36.7 (28 Sep 2023 04:05), Max: 36.8 (27 Sep 2023 12:13)  T(F): 98.1 (28 Sep 2023 04:05), Max: 98.3 (27 Sep 2023 12:13)  HR: 61 (28 Sep 2023 06:00) (56 - 74)  BP: 120/76 (28 Sep 2023 06:00) (92/64 - 128/82)  BP(mean): 88 (28 Sep 2023 06:00) (71 - 102)  ABP: --  ABP(mean): --  RR: 16 (28 Sep 2023 06:00) (11 - 23)  SpO2: 98% (28 Sep 2023 06:00) (94% - 100%)    O2 Parameters below as of 28 Sep 2023 06:00  Patient On (Oxygen Delivery Method): room air                 Input and Output:  I&O's Detail    26 Sep 2023 07:01  -  27 Sep 2023 07:00  --------------------------------------------------------  IN:    Oral Fluid: 400 mL  Total IN: 400 mL    OUT:    Voided (mL): 2200 mL  Total OUT: 2200 mL    Total NET: -1800 mL      27 Sep 2023 07:01  -  28 Sep 2023 06:59  --------------------------------------------------------  IN:    Oral Fluid: 410 mL  Total IN: 410 mL    OUT:    Indwelling Catheter - Urethral (mL): 450 mL    Voided (mL): 600 mL  Total OUT: 1050 mL    Total NET: -640 mL          Lab Data                        15.6   7.63  )-----------( 182      ( 28 Sep 2023 06:00 )             46.6     09-28    137  |  103  |  18  ----------------------------<  89  3.8   |  27  |  0.95    Ca    9.6      28 Sep 2023 06:00  Phos  3.5     09-27  Mg     2.2     09-27    TPro  7.1  /  Alb  4.1  /  TBili  1.6<H>  /  DBili  x   /  AST  36  /  ALT  28  /  AlkPhos  58  09-27      CARDIAC MARKERS ( 26 Sep 2023 17:08 )  x     / x     / 291 U/L / x     / x            Review of Systems	      Objective     Physical Examination    heart s1s2  lung dec BS  head nc      Pertinent Lab findings & Imaging      Shani:  NO   Adequate UO     I&O's Detail    26 Sep 2023 07:01  -  27 Sep 2023 07:00  --------------------------------------------------------  IN:    Oral Fluid: 400 mL  Total IN: 400 mL    OUT:    Voided (mL): 2200 mL  Total OUT: 2200 mL    Total NET: -1800 mL      27 Sep 2023 07:01  -  28 Sep 2023 06:59  --------------------------------------------------------  IN:    Oral Fluid: 410 mL  Total IN: 410 mL    OUT:    Indwelling Catheter - Urethral (mL): 450 mL    Voided (mL): 600 mL  Total OUT: 1050 mL    Total NET: -640 mL               Discussed with:     Cultures:	        Radiology                            
Patient is a 68y old  Male who presents with a chief complaint of AMS, difficulty breathing -> hypertensive crisis (27 Sep 2023 05:58)      INTERVAL HPI/OVERNIGHT EVENTS:  Patient seen and examined in am, now A+Ox3, does not clearly remember events yesterday, denies current pain, fever, chills, nausea, vomiting abdominal pain, dizziness    ROS reviewed and is otherwise negative        Vital Signs Last 24 Hrs  T(C): 36.8 (27 Sep 2023 12:13), Max: 37.2 (26 Sep 2023 17:20)  T(F): 98.3 (27 Sep 2023 12:13), Max: 98.9 (26 Sep 2023 17:20)  HR: 60 (27 Sep 2023 13:00) (57 - 86)  BP: 113/93 (27 Sep 2023 13:00) (97/69 - 233/119)  BP(mean): 102 (27 Sep 2023 13:00) (73 - 102)  RR: 14 (27 Sep 2023 13:00) (11 - 30)  SpO2: 97% (27 Sep 2023 13:00) (94% - 100%)    Parameters below as of 27 Sep 2023 08:00  Patient On (Oxygen Delivery Method): room air        PHYSICAL EXAM:  GENERAL: NAD, older male  HEAD:  Atraumatic, Normocephalic  EYES: EOMI, PERRLA  ENMT: Moist mucous membranes, Good dentition, No lesions; No tonsillar erythema, exudates, or enlargement  NECK: Supple, No JVD,  NERVOUS SYSTEM:  Alert & Oriented X3, Good concentration; All 4 extremities mobile, no gross sensory deficits.   CHEST/LUNG: Clear to auscultation bilaterally; No rales, rhonchi, wheezing, or rubs  HEART: Regular rate and rhythm; No murmurs, rubs, or gallops  ABDOMEN: Soft, Nontender, Nondistended; Bowel sounds present  EXTREMITIES:  + Pulses, No clubbing, cyanosis, or edema  SKIN: No rashes or lesions    MEDICATIONS  (STANDING):  aspirin enteric coated 81 milliGRAM(s) Oral daily  atorvastatin 40 milliGRAM(s) Oral at bedtime  clopidogrel Tablet 75 milliGRAM(s) Oral daily  enoxaparin Injectable 40 milliGRAM(s) SubCutaneous every 24 hours  influenza  Vaccine (HIGH DOSE) 0.7 milliLiter(s) IntraMuscular once  lisinopril 10 milliGRAM(s) Oral daily    MEDICATIONS  (PRN):  acetaminophen     Tablet .. 650 milliGRAM(s) Oral every 6 hours PRN Temp greater or equal to 38C (100.4F), Mild Pain (1 - 3)  albuterol/ipratropium for Nebulization 3 milliLiter(s) Nebulizer every 6 hours PRN Shortness of Breath and/or Wheezing  aluminum hydroxide/magnesium hydroxide/simethicone Suspension 30 milliLiter(s) Oral every 4 hours PRN Dyspepsia  LORazepam   Injectable 1 milliGRAM(s) IV Push every 30 minutes PRN Agitation  LORazepam   Injectable 0.5 milliGRAM(s) IV Push every 1 hour PRN Anxiety  melatonin 3 milliGRAM(s) Oral at bedtime PRN Insomnia      Allergies    No Known Allergies    Intolerances          LABS:                        15.6   12.98 )-----------( 212      ( 27 Sep 2023 05:32 )             47.9     27 Sep 2023 05:32    137    |  103    |  14     ----------------------------<  84     4.4     |  21     |  1.19     Ca    9.9        27 Sep 2023 05:32  Phos  3.5       27 Sep 2023 05:32  Mg     2.2       27 Sep 2023 05:32    TPro  7.1    /  Alb  4.1    /  TBili  1.6    /  DBili  x      /  AST  36     /  ALT  28     /  AlkPhos  58     27 Sep 2023 05:32      Urinalysis Basic - ( 27 Sep 2023 05:32 )    Color: x / Appearance: x / SG: x / pH: x  Gluc: 84 mg/dL / Ketone: x  / Bili: x / Urobili: x   Blood: x / Protein: x / Nitrite: x   Leuk Esterase: x / RBC: x / WBC x   Sq Epi: x / Non Sq Epi: x / Bacteria: x      CAPILLARY BLOOD GLUCOSE          RADIOLOGY & ADDITIONAL TESTS:        Care Discussed with Consultants/Other Providers:    Advanced Directives: [ ] DNR  [ ] No feeding tube  [ ] MOLST in chart  [ ] MOLST completed today  [ ] Unknown

## 2023-09-28 NOTE — PROGRESS NOTE ADULT - REASON FOR ADMISSION
AMS, difficulty breathing -> hypertensive crisis

## 2024-05-14 NOTE — PATIENT PROFILE ADULT - FUNCTIONAL ASSESSMENT - BASIC MOBILITY 2.
GYNECOLOGIC OFFICE VISIT     Ale Munson is a 25 year old s/p LEEP on 4/14/24 here for a postop visit. Overall doing well since the procedure. No further pain or abnormal vaginal discharge. No vaginal bleeding. Normal bowel movements and urination. Tolerating general diet. No concerns today.     Also notes a history of infertility. States she has been trying to get pregnant for 1.5 years but has been unable to. Would like to undergo work up to see why she isn't conceiving. Notes normal menstrual cycles each month.       Review of Systems:   Constitutional: No fevers, no chills   Eyes: No changes in vision   Respiratory: No shortness of breath, no cough ?  Cardiovascular: No chest pain, no edema    Gastrointestinal: No abdominal pain, no nausea, no vomiting, no diarrhea  Genitourinary: No dysuria, no hematuria   Musculoskeletal: No arthralgia, no myalgia  Skin: No rash   Neurologic: No headache, no focal weakness       PHYSICAL EXAM:   Visit Vitals  /82   Pulse 72   Ht 5' (1.524 m)   Wt 55.2 kg (121 lb 9.6 oz)   LMP 04/20/2024 (Approximate)   BMI 23.75 kg/m²     Constitutional: No acute distress, well nourished   Respiratory: No increased work of breathing  Cardiovascular: Regular rate   Gastrointestinal: Soft, non-tender, non-distended ?  Skin: Intact, warm, dry no rashes  Neurologic: Alert and oriented x3, no cognitive impairment, no focal deficits  Ext: No edema, 2+ pulses?  Pelvic: LEEP bed well healed     Pathologic Diagnosis   A.   Cervix, LEEP excision:  -Focal high-grade squamous intraepithelial lesion (RAVI-2)  -Inked margins negative for dysplasia     B.   Cervix, Top-Hat, excision:  -Negative for dysplasia       ASSESSMENT AND PLAN: Ale Munson is a 25 year old here for postop visit after LEEP.     #Postop  -Doing well, recovering as expected  -Pathology reviewed- RAVI 2 with negative margins  -Recommend repeat pap smear with cotesting in 6 months     #Infertility  -Began  counseling on infertility   -Ordered day 21 labs as she is able to do them today  -Will need day 3 labs and full work up to be discussed at next visit     RTC 1 month for infertility counseling and work up     Jelly Byers MD, MPH   Obstetrics and Gynecology      4 = No assist / stand by assistance

## 2024-09-27 NOTE — ED PROVIDER NOTE - NORMAL, MLM
"HPI  Dina Mullins is a 72 y.o. female 10 years of dizziness.  Saw Dr. Burt in 2014 and had vertigo symptoms with weakness on the right on VNG.  She was treated for BPPV and has had intermittent vertigo since that time.  More recently she has been assigned a diagnosis of Parkinson's related to tremor and gait.  She had an MRI in late 2023 without tumor or mass.  She had been on a dopamine medication but got away from this and has not noticed much difference with her symptoms on it or off of it.  She has had some increased dizziness different from her vertigo recently and this is what she presents to discuss today.  She is using a cane in the home and outside of the home.  She describes present symptoms as feeling \"drunk\" and the symptoms are lasting more into the day with a generalized unsteadiness.  She was evaluated by Dr. Padgett in April.  She was evaluated by [facility] and underwent repositioning for nonclassic findings to the left on Amador-Hallpike maneuver.  She has not had loss of consciousness      Past Medical History:   Diagnosis Date    Anemia     Arthritis     Atrial fib/flutter, transient (Multi)     Bilateral tinnitus     Chronic kidney failure     stage three    DM (diabetes mellitus) (Multi)     Hypertension     Left arm pain     Sepsis (Multi)     Septic shock (Multi)     Stomach ulcer     Vertigo             Medications:     Current Outpatient Medications:     acetaminophen (TylenoL) 325 mg tablet, Take 2 tablets (650 mg) by mouth every 4 hours if needed for moderate pain (4 - 6) or mild pain (1 - 3)., Disp: , Rfl:     atorvastatin (Lipitor) 40 mg tablet, Take 1 tablet (40 mg) by mouth once daily at bedtime., Disp: 90 tablet, Rfl: 1    carbidopa-levodopa (Sinemet)  mg tablet, Take 1 tablet by mouth 3 times a day. (Patient taking differently: Take 1 tablet by mouth 3 times a day. RAN out), Disp: 90 tablet, Rfl: 3    diphenhydrAMINE-acetaminophen (Tylenol PM)  mg per tablet, Take 1 " "tablet by mouth as needed at bedtime for sleep., Disp: , Rfl:     DULoxetine (Cymbalta) 20 mg DR capsule, Take 1 capsule (20 mg) by mouth once daily., Disp: 90 capsule, Rfl: 1    fluticasone (Flonase) 50 mcg/actuation nasal spray, Administer 1 spray into each nostril once daily as needed for allergies or rhinitis., Disp: 16 g, Rfl: 1    furosemide (Lasix) 40 mg tablet, Take 1 tablet (40 mg) by mouth once daily., Disp: 90 tablet, Rfl: 1    Lantus Solostar U-100 Insulin 100 unit/mL (3 mL) pen, INJECT 25 UNITS UNDER THE SKIN ONCE DAILY IN THE MORNING, Disp: 9 mL, Rfl: 0    lisinopril 10 mg tablet, Take 1 tablet (10 mg) by mouth once daily., Disp: 90 tablet, Rfl: 1    meclizine (Antivert) 25 mg tablet, Take 1 tablet (25 mg) by mouth 3 times a day as needed for dizziness for up to 20 doses., Disp: 20 tablet, Rfl: 0    meclizine (Antivert) 25 mg tablet, Take 1 tablet (25 mg) by mouth 3 times a day as needed for dizziness., Disp: 90 tablet, Rfl: 2    OneTouch Verio test strips strip, 1 strip by in vitro route 4 times a day. And as needed, Disp: 300 strip, Rfl: 3    pen needle, diabetic 32 gauge x 5/32\" needle, USE AS DIRECTED, Disp: 100 each, Rfl: 3    rivaroxaban (Xarelto) 20 mg tablet, Take 1 tablet (20 mg) by mouth once daily in the evening. Take with meals., Disp: 90 tablet, Rfl: 3    methocarbamol (Robaxin) 750 mg tablet, Take 1 tablet (750 mg) by mouth 3 times a day., Disp: 90 tablet, Rfl: 0     Allergies:  Allergies   Allergen Reactions    Codeine Nausea/vomiting        Physical Exam:  Last Recorded Vitals  Temperature 36.9 °C (98.4 °F), height 1.575 m (5' 2\").  General:     General appearance: Well-developed, well-nourished in no acute distress.       Voice:  normal       Head/face: Normal appearance; nontender to palpation     Facial nerve function: Normal and symmetric bilaterally.    Oral/oropharynx:     Oral vestibule: Normal labial and gingival mucosa     Tongue/floor of mouth: Normal without lesion     " Oropharynx: Clear.  No lesions present of the hard/soft palate, posterior pharynx    Neck:     Neck: Normal appearance, trachea midline     Salivary glands: Normal to palpation bilaterally     Lymph nodes: No cervical lymphadenopathy to palpation     Thyroid: No thyromegaly.  No palpable nodules     Range of motion: Normal    Neurological:     Cortical functions: Alert and oriented x3, appropriate affect       Larynx/hypopharynx:     Laryngeal findings: Mirror exam inadequate or limited secondary to enlarged base of tongue and/or excessive gagging    Ear:     Ear canal: Normal bilaterally     Tympanic membrane: Intact and mobile bilaterally     Pinna: Normal bilaterally     Hearing:  Gross hearing assessment normal by voice    Nose:     Visualized using: Anterior rhinoscopy     Nasopharynx: Inadequate mirror exam secondary to gag, anatomy.       Nasal dorsum: Nontraumatic midline appearance     Septum: Midline     Inferior turbinates: Normally sized     Mucosa: Bilateral, pink, normal appearing       ASSESSMENT/PLAN:  This is a challenging situation.  It is probably multifactorial to some degree.  I doubt that it is solely explained by BPPV.  There may be a parkinsonian contribution as well.  I recommended that she reengage neurology.  I have recommended vestibular rehab.  I have not recommended any more peripheral balance testing or treatment at this time.  The MRI in 2023 was reviewed personally and does not demonstrate obvious mass in the CP angle/IAC.  This was not a formal MRI of the IAC but I think it is unlikely that there is specific pathology there to explain her symptoms as she describes them.  Follow-up after rehab        Yassine Awan MD   [facility] normal

## 2025-01-17 PROBLEM — E78.5 HYPERLIPIDEMIA, UNSPECIFIED: Chronic | Status: ACTIVE | Noted: 2023-09-26

## 2025-01-17 PROBLEM — F98.8 OTHER SPECIFIED BEHAVIORAL AND EMOTIONAL DISORDERS WITH ONSET USUALLY OCCURRING IN CHILDHOOD AND ADOLESCENCE: Chronic | Status: ACTIVE | Noted: 2023-09-26

## 2025-01-21 ENCOUNTER — APPOINTMENT (OUTPATIENT)
Dept: SURGERY | Facility: CLINIC | Age: 70
End: 2025-01-21
Payer: COMMERCIAL

## 2025-01-21 VITALS
OXYGEN SATURATION: 92 % | DIASTOLIC BLOOD PRESSURE: 66 MMHG | SYSTOLIC BLOOD PRESSURE: 103 MMHG | WEIGHT: 175 LBS | BODY MASS INDEX: 25.05 KG/M2 | HEART RATE: 66 BPM | HEIGHT: 70 IN

## 2025-01-21 DIAGNOSIS — K40.90 UNILATERAL INGUINAL HERNIA, W/OUT OBSTRUCTION OR GANGRENE, NOT SPECIFIED AS RECURRENT: ICD-10-CM

## 2025-01-21 DIAGNOSIS — Z86.79 PERSONAL HISTORY OF OTHER DISEASES OF THE CIRCULATORY SYSTEM: ICD-10-CM

## 2025-01-21 DIAGNOSIS — Z87.891 PERSONAL HISTORY OF NICOTINE DEPENDENCE: ICD-10-CM

## 2025-01-21 DIAGNOSIS — Z86.39 PERSONAL HISTORY OF OTHER ENDOCRINE, NUTRITIONAL AND METABOLIC DISEASE: ICD-10-CM

## 2025-01-21 PROCEDURE — 99204 OFFICE O/P NEW MOD 45 MIN: CPT | Mod: 57

## 2025-01-21 RX ORDER — RAMIPRIL 5 MG/1
CAPSULE ORAL
Refills: 0 | Status: ACTIVE | COMMUNITY

## 2025-01-21 RX ORDER — CITALOPRAM HYDROBROMIDE 40 MG/1
40 TABLET, FILM COATED ORAL
Refills: 0 | Status: ACTIVE | COMMUNITY

## 2025-01-21 RX ORDER — ATOMOXETINE HYDROCHLORIDE 100 MG/1
CAPSULE ORAL
Refills: 0 | Status: ACTIVE | COMMUNITY

## 2025-01-21 RX ORDER — ASPIRIN 325 MG/1
TABLET, FILM COATED ORAL
Refills: 0 | Status: ACTIVE | COMMUNITY

## 2025-01-21 RX ORDER — METOPROLOL TARTRATE 50 MG/1
50 TABLET ORAL
Refills: 0 | Status: ACTIVE | COMMUNITY

## 2025-01-21 RX ORDER — CLOPIDOGREL 75 MG/1
75 TABLET, FILM COATED ORAL
Refills: 0 | Status: ACTIVE | COMMUNITY

## 2025-01-21 RX ORDER — ATORVASTATIN CALCIUM 80 MG/1
TABLET, FILM COATED ORAL
Refills: 0 | Status: ACTIVE | COMMUNITY

## 2025-01-30 ENCOUNTER — OUTPATIENT (OUTPATIENT)
Dept: OUTPATIENT SERVICES | Facility: HOSPITAL | Age: 70
LOS: 1 days | End: 2025-01-30
Payer: COMMERCIAL

## 2025-01-30 VITALS
RESPIRATION RATE: 16 BRPM | SYSTOLIC BLOOD PRESSURE: 95 MMHG | WEIGHT: 177.03 LBS | TEMPERATURE: 98 F | OXYGEN SATURATION: 96 % | HEART RATE: 65 BPM | DIASTOLIC BLOOD PRESSURE: 69 MMHG

## 2025-01-30 DIAGNOSIS — Z98.890 OTHER SPECIFIED POSTPROCEDURAL STATES: Chronic | ICD-10-CM

## 2025-01-30 DIAGNOSIS — Z98.52 VASECTOMY STATUS: Chronic | ICD-10-CM

## 2025-01-30 DIAGNOSIS — Z01.818 ENCOUNTER FOR OTHER PREPROCEDURAL EXAMINATION: ICD-10-CM

## 2025-01-30 DIAGNOSIS — Z95.5 PRESENCE OF CORONARY ANGIOPLASTY IMPLANT AND GRAFT: ICD-10-CM

## 2025-01-30 DIAGNOSIS — Z95.5 PRESENCE OF CORONARY ANGIOPLASTY IMPLANT AND GRAFT: Chronic | ICD-10-CM

## 2025-01-30 DIAGNOSIS — K40.90 UNILATERAL INGUINAL HERNIA, WITHOUT OBSTRUCTION OR GANGRENE, NOT SPECIFIED AS RECURRENT: ICD-10-CM

## 2025-01-30 LAB
ALBUMIN SERPL ELPH-MCNC: 3.6 G/DL — SIGNIFICANT CHANGE UP (ref 3.3–5)
ALP SERPL-CCNC: 83 U/L — SIGNIFICANT CHANGE UP (ref 40–120)
ALT FLD-CCNC: 24 U/L — SIGNIFICANT CHANGE UP (ref 12–78)
ANION GAP SERPL CALC-SCNC: 4 MMOL/L — LOW (ref 5–17)
AST SERPL-CCNC: 23 U/L — SIGNIFICANT CHANGE UP (ref 15–37)
BILIRUB SERPL-MCNC: 0.9 MG/DL — SIGNIFICANT CHANGE UP (ref 0.2–1.2)
BLD GP AB SCN SERPL QL: SIGNIFICANT CHANGE UP
BUN SERPL-MCNC: 17 MG/DL — SIGNIFICANT CHANGE UP (ref 7–23)
CALCIUM SERPL-MCNC: 9.9 MG/DL — SIGNIFICANT CHANGE UP (ref 8.5–10.1)
CHLORIDE SERPL-SCNC: 109 MMOL/L — HIGH (ref 96–108)
CO2 SERPL-SCNC: 26 MMOL/L — SIGNIFICANT CHANGE UP (ref 22–31)
CREAT SERPL-MCNC: 1 MG/DL — SIGNIFICANT CHANGE UP (ref 0.5–1.3)
EGFR: 80 ML/MIN/1.73M2 — SIGNIFICANT CHANGE UP
GLUCOSE SERPL-MCNC: 98 MG/DL — SIGNIFICANT CHANGE UP (ref 70–99)
HCT VFR BLD CALC: 47.5 % — SIGNIFICANT CHANGE UP (ref 39–50)
HGB BLD-MCNC: 16.4 G/DL — SIGNIFICANT CHANGE UP (ref 13–17)
MCHC RBC-ENTMCNC: 31.2 PG — SIGNIFICANT CHANGE UP (ref 27–34)
MCHC RBC-ENTMCNC: 34.5 G/DL — SIGNIFICANT CHANGE UP (ref 32–36)
MCV RBC AUTO: 90.5 FL — SIGNIFICANT CHANGE UP (ref 80–100)
NRBC # BLD: 0 /100 WBCS — SIGNIFICANT CHANGE UP (ref 0–0)
NRBC BLD-RTO: 0 /100 WBCS — SIGNIFICANT CHANGE UP (ref 0–0)
PLATELET # BLD AUTO: 226 K/UL — SIGNIFICANT CHANGE UP (ref 150–400)
POTASSIUM SERPL-MCNC: 3.9 MMOL/L — SIGNIFICANT CHANGE UP (ref 3.5–5.3)
POTASSIUM SERPL-SCNC: 3.9 MMOL/L — SIGNIFICANT CHANGE UP (ref 3.5–5.3)
PROT SERPL-MCNC: 7 G/DL — SIGNIFICANT CHANGE UP (ref 6–8.3)
RBC # BLD: 5.25 M/UL — SIGNIFICANT CHANGE UP (ref 4.2–5.8)
RBC # FLD: 12.1 % — SIGNIFICANT CHANGE UP (ref 10.3–14.5)
SODIUM SERPL-SCNC: 139 MMOL/L — SIGNIFICANT CHANGE UP (ref 135–145)
WBC # BLD: 6.74 K/UL — SIGNIFICANT CHANGE UP (ref 3.8–10.5)
WBC # FLD AUTO: 6.74 K/UL — SIGNIFICANT CHANGE UP (ref 3.8–10.5)

## 2025-01-30 PROCEDURE — 80053 COMPREHEN METABOLIC PANEL: CPT

## 2025-01-30 PROCEDURE — G0463: CPT

## 2025-01-30 PROCEDURE — 86850 RBC ANTIBODY SCREEN: CPT

## 2025-01-30 PROCEDURE — 86901 BLOOD TYPING SEROLOGIC RH(D): CPT

## 2025-01-30 PROCEDURE — 86900 BLOOD TYPING SEROLOGIC ABO: CPT

## 2025-01-30 PROCEDURE — 36415 COLL VENOUS BLD VENIPUNCTURE: CPT

## 2025-01-30 PROCEDURE — 93005 ELECTROCARDIOGRAM TRACING: CPT

## 2025-01-30 PROCEDURE — 93010 ELECTROCARDIOGRAM REPORT: CPT

## 2025-01-30 PROCEDURE — 85027 COMPLETE CBC AUTOMATED: CPT

## 2025-01-30 NOTE — H&P PST ADULT - NSICDXPASTMEDICALHX_GEN_ALL_CORE_FT
PAST MEDICAL HISTORY:  ADD (attention deficit disorder)     Benign prostate hyperplasia     Hyperlipidemia     Stented coronary artery      PAST MEDICAL HISTORY:  ADD (attention deficit disorder)     Anxiety     Benign prostate hyperplasia - no meds    Hyperlipidemia     Hypertension     Stented coronary artery - coronary stents, 1990's    Unilateral inguinal hernia, without obstruction or gangrene, not specified as recurrent

## 2025-01-30 NOTE — H&P PST ADULT - PSYCHIATRIC COMMENTS
Pt denies panic attacks and denies seeing a therapist. Pt denies panic attacks and seeing a therapist monthly

## 2025-01-30 NOTE — H&P PST ADULT - PROBLEM SELECTOR PLAN 3
Medical clearance needed as per surgeon. CBC, Comprehensive panel , T&S and EKG ordered. Pre-op instructions and surgical scrubs given and pt verbalized understanding.

## 2025-01-30 NOTE — H&P PST ADULT - HISTORY OF PRESENT ILLNESS
70 y/o male with PMH of CAD (s/p coronary stent x 7, last 1990's) and HTN here for PST. Pt first noticed  70 y/o male with PMH of CAD (s/p coronary stent x 7, last 1990's) and HTN here for PST. Pt first noticed bulge and discomfort to right groin about 1 month ago worse with physical exertion. Pt diagnosed with right inguinal hernia. Pt denies n/v/d and current abdominal pain. Pt electing for robotic assisted laparoscopic right inguinal hernia repair on 2/14/25.

## 2025-01-30 NOTE — H&P PST ADULT - ASSESSMENT
70 y/o male with unilateral inguinal hernia, without obstruction, without gangrene, not specified as recurrent.

## 2025-01-30 NOTE — H&P PST ADULT - PROBLEM SELECTOR PLAN 1
Cardiac clearance needed. Instructed pt to continue baby aspirin until morning of surgery. Stop Plavix 7 days before surgery as per cardiologist. Pt verbalized understanding.

## 2025-01-30 NOTE — H&P PST ADULT - NSICDXPASTSURGICALHX_GEN_ALL_CORE_FT
PAST SURGICAL HISTORY:  H/O hernia repair     H/O oral surgery     H/O vasectomy     S/P colonoscopy     S/P coronary artery stent placement

## 2025-02-13 NOTE — ASU PATIENT PROFILE, ADULT - NS PRO ABUSE SCREEN AFRAID ANYONE YN
no reading glasses/Partially impaired: cannot see medication labels or newsprint, but can see obstacles in path, and the surrounding layout; can count fingers at arm's length

## 2025-02-13 NOTE — ASU PATIENT PROFILE, ADULT - VISION (WITH CORRECTIVE LENSES IF THE PATIENT USUALLY WEARS THEM):
wear glasses to read/Normal vision: sees adequately in most situations; can see medication labels, newsprint

## 2025-02-14 ENCOUNTER — TRANSCRIPTION ENCOUNTER (OUTPATIENT)
Age: 70
End: 2025-02-14

## 2025-02-14 ENCOUNTER — OUTPATIENT (OUTPATIENT)
Dept: OUTPATIENT SERVICES | Facility: HOSPITAL | Age: 70
LOS: 1 days | End: 2025-02-14
Payer: COMMERCIAL

## 2025-02-14 ENCOUNTER — APPOINTMENT (OUTPATIENT)
Dept: SURGERY | Facility: HOSPITAL | Age: 70
End: 2025-02-14

## 2025-02-14 VITALS
TEMPERATURE: 98 F | HEART RATE: 58 BPM | SYSTOLIC BLOOD PRESSURE: 120 MMHG | WEIGHT: 177.03 LBS | RESPIRATION RATE: 18 BRPM | OXYGEN SATURATION: 96 % | DIASTOLIC BLOOD PRESSURE: 82 MMHG

## 2025-02-14 VITALS
HEART RATE: 61 BPM | SYSTOLIC BLOOD PRESSURE: 105 MMHG | OXYGEN SATURATION: 97 % | DIASTOLIC BLOOD PRESSURE: 73 MMHG | RESPIRATION RATE: 16 BRPM

## 2025-02-14 DIAGNOSIS — Z98.890 OTHER SPECIFIED POSTPROCEDURAL STATES: Chronic | ICD-10-CM

## 2025-02-14 DIAGNOSIS — Z95.5 PRESENCE OF CORONARY ANGIOPLASTY IMPLANT AND GRAFT: Chronic | ICD-10-CM

## 2025-02-14 DIAGNOSIS — Z98.52 VASECTOMY STATUS: Chronic | ICD-10-CM

## 2025-02-14 LAB — ABO RH CONFIRMATION: SIGNIFICANT CHANGE UP

## 2025-02-14 PROCEDURE — 49650 LAP ING HERNIA REPAIR INIT: CPT | Mod: 59,RT

## 2025-02-14 PROCEDURE — S2900 ROBOTIC SURGICAL SYSTEM: CPT | Mod: NC

## 2025-02-14 PROCEDURE — C9399: CPT

## 2025-02-14 PROCEDURE — 49650 LAP ING HERNIA REPAIR INIT: CPT | Mod: RT

## 2025-02-14 PROCEDURE — S2900: CPT

## 2025-02-14 PROCEDURE — 36415 COLL VENOUS BLD VENIPUNCTURE: CPT

## 2025-02-14 PROCEDURE — C1781: CPT

## 2025-02-14 DEVICE — MESH HERNIA INGUINAL PARIETEX PROGRIP RECTANGLE 15 X 15CM: Type: IMPLANTABLE DEVICE | Site: RIGHT | Status: FUNCTIONAL

## 2025-02-14 RX ORDER — ALPRAZOLAM 2 MG
1 TABLET ORAL
Refills: 0 | DISCHARGE

## 2025-02-14 RX ORDER — ASPIRIN 81 MG/1
1 TABLET, COATED ORAL
Refills: 0 | DISCHARGE

## 2025-02-14 RX ORDER — IBUPROFEN 600 MG/1
1 TABLET, FILM COATED ORAL
Qty: 12 | Refills: 0
Start: 2025-02-14

## 2025-02-14 RX ORDER — HYDROMORPHONE HYDROCHLORIDE 4 MG/ML
0.5 INJECTION, SOLUTION INTRAMUSCULAR; INTRAVENOUS; SUBCUTANEOUS
Refills: 0 | Status: DISCONTINUED | OUTPATIENT
Start: 2025-02-14 | End: 2025-02-14

## 2025-02-14 RX ORDER — OXYCODONE HYDROCHLORIDE 30 MG/1
5 TABLET ORAL ONCE
Refills: 0 | Status: DISCONTINUED | OUTPATIENT
Start: 2025-02-14 | End: 2025-02-14

## 2025-02-14 RX ORDER — DOCUSATE SODIUM 100 MG
1 CAPSULE ORAL
Qty: 12 | Refills: 0
Start: 2025-02-14

## 2025-02-14 RX ORDER — OXYCODONE HYDROCHLORIDE 30 MG/1
1 TABLET ORAL
Qty: 16 | Refills: 0
Start: 2025-02-14

## 2025-02-14 RX ORDER — SODIUM CHLORIDE 9 G/ML
1000 INJECTION, SOLUTION INTRAVENOUS
Refills: 0 | Status: DISCONTINUED | OUTPATIENT
Start: 2025-02-14 | End: 2025-02-14

## 2025-02-14 RX ORDER — CEFAZOLIN SODIUM IN 0.9 % NACL 2 G/10 ML
2000 SYRINGE (ML) INTRAVENOUS ONCE
Refills: 0 | Status: COMPLETED | OUTPATIENT
Start: 2025-02-14 | End: 2025-02-14

## 2025-02-14 RX ORDER — METOPROLOL SUCCINATE 25 MG
1 TABLET, EXTENDED RELEASE 24 HR ORAL
Refills: 0 | DISCHARGE

## 2025-02-14 RX ORDER — CITALOPRAM HYDROBROMIDE 20 MG
1.5 TABLET ORAL
Refills: 0 | DISCHARGE

## 2025-02-14 RX ORDER — ATORVASTATIN CALCIUM 80 MG/1
1 TABLET, FILM COATED ORAL
Refills: 0 | DISCHARGE

## 2025-02-14 RX ORDER — ONDANSETRON 4 MG/1
4 TABLET, ORALLY DISINTEGRATING ORAL ONCE
Refills: 0 | Status: DISCONTINUED | OUTPATIENT
Start: 2025-02-14 | End: 2025-02-14

## 2025-02-14 RX ORDER — ATOMOXETINE 18 MG/1
2 CAPSULE ORAL
Refills: 0 | DISCHARGE

## 2025-02-14 RX ORDER — RAMIPRIL 2.5 MG/1
0 CAPSULE ORAL
Refills: 0 | DISCHARGE

## 2025-02-14 RX ADMIN — SODIUM CHLORIDE 75 MILLILITER(S): 9 INJECTION, SOLUTION INTRAVENOUS at 12:31

## 2025-02-14 RX ADMIN — SODIUM CHLORIDE 75 MILLILITER(S): 9 INJECTION, SOLUTION INTRAVENOUS at 09:37

## 2025-02-14 RX ADMIN — OXYCODONE HYDROCHLORIDE 5 MILLIGRAM(S): 30 TABLET ORAL at 12:32

## 2025-02-14 RX ADMIN — OXYCODONE HYDROCHLORIDE 5 MILLIGRAM(S): 30 TABLET ORAL at 13:00

## 2025-02-14 NOTE — ASU DISCHARGE PLAN (ADULT/PEDIATRIC) - CARE PROVIDER_API CALL
Leo Sow  Surgery  [address] [place] [address], [address]  [place] [address]  Phone: [phone]  Fax: [phone]  Established Patient  Follow Up Time: 1 week

## 2025-02-14 NOTE — ASU DISCHARGE PLAN (ADULT/PEDIATRIC) - FINANCIAL ASSISTANCE
[facility] provides services at a reduced cost to those who are determined to be eligible through [facility]’s financial assistance program. Information regarding [facility]’s financial assistance program can be found by going to [email or web address] or by calling [phone].

## 2025-02-14 NOTE — BRIEF OPERATIVE NOTE - COMMENTS
I, Lew Witt PA-C provided direct first assist support to the surgeon during this surgical procedure. My involvement included positioning, prepping and draping the patient prior to surgery, robotic port placement, robotic docking, robotic instrument insertion and removal, ensuring clear visibility and exposure for the surgeon by using instruments such as retractors, suction and sponges, stapling tissues and vessels, retrieving specimens from the operative field, closing surgical incisions, undocking the robot and dressing wounds.  As well as other tasks as directed by the surgeon.

## 2025-02-14 NOTE — ASU DISCHARGE PLAN (ADULT/PEDIATRIC) - NS MD DC FALL RISK RISK
For information on Fall & Injury Prevention, visit: [email or web address] OR  [email or web address] OR  https://www.cdc.gov/steadi/patient.html

## 2025-02-18 PROBLEM — N40.0 BENIGN PROSTATIC HYPERPLASIA WITHOUT LOWER URINARY TRACT SYMPTOMS: Chronic | Status: ACTIVE | Noted: 2023-09-26

## 2025-02-18 PROBLEM — I10 ESSENTIAL (PRIMARY) HYPERTENSION: Chronic | Status: ACTIVE | Noted: 2025-01-30

## 2025-02-18 PROBLEM — K40.90 UNILATERAL INGUINAL HERNIA, WITHOUT OBSTRUCTION OR GANGRENE, NOT SPECIFIED AS RECURRENT: Chronic | Status: ACTIVE | Noted: 2025-01-30

## 2025-02-20 ENCOUNTER — APPOINTMENT (OUTPATIENT)
Dept: SURGERY | Facility: CLINIC | Age: 70
End: 2025-02-20
Payer: COMMERCIAL

## 2025-02-20 PROCEDURE — 99024 POSTOP FOLLOW-UP VISIT: CPT

## 2025-03-20 ENCOUNTER — APPOINTMENT (OUTPATIENT)
Dept: SURGERY | Facility: CLINIC | Age: 70
End: 2025-03-20
Payer: COMMERCIAL

## 2025-03-20 DIAGNOSIS — Z98.890 OTHER SPECIFIED POSTPROCEDURAL STATES: ICD-10-CM

## 2025-03-20 DIAGNOSIS — Z87.19 OTHER SPECIFIED POSTPROCEDURAL STATES: ICD-10-CM

## 2025-03-20 PROCEDURE — 99024 POSTOP FOLLOW-UP VISIT: CPT

## (undated) DEVICE — XI ARM SCISSOR MONO CURVED

## (undated) DEVICE — DRSG MASTISOL

## (undated) DEVICE — TUBING STRYKER PNEUMOSURE HEATED RTP

## (undated) DEVICE — SOL IRR POUR NS 0.9% 1000ML

## (undated) DEVICE — WARMING BLANKET UPPER ADULT

## (undated) DEVICE — XI 12MM AND STAPLER CANNULA SEAL

## (undated) DEVICE — XI ENDOWRIST SUCTION IRRIGATOR 8MM

## (undated) DEVICE — XI DRAPE ARM

## (undated) DEVICE — BLADE SCALPEL SAFETYLOCK #15

## (undated) DEVICE — PLV/PSP-SUCTION IRRIGATOR STRYKER: Type: DURABLE MEDICAL EQUIPMENT

## (undated) DEVICE — PLV-SCD MACHINE: Type: DURABLE MEDICAL EQUIPMENT

## (undated) DEVICE — XI ENDOWRIST 12 - 8 MM CANNULA REDUCER

## (undated) DEVICE — SUT POLYSORB 0 30" GU-46

## (undated) DEVICE — XI CANNULA SEAL 5MM - 8 MM

## (undated) DEVICE — ELCTR BOVIE TIP BLADE INSULATED 2.75" EDGE

## (undated) DEVICE — XI VESSEL SEALER

## (undated) DEVICE — DRAPE 3/4 SHEET W REINFORCEMENT 56X77"

## (undated) DEVICE — XI SCISSOR TIP COVER

## (undated) DEVICE — PACK GENERAL LAPAROSCOPY

## (undated) DEVICE — SUT VLOC 180 3-0 6" V-20 GREEN

## (undated) DEVICE — DRSG OPSITE 2.5 X 2"

## (undated) DEVICE — DRSG STERISTRIPS 0.5 X 4"

## (undated) DEVICE — VENODYNE/SCD SLEEVE CALF MEDIUM

## (undated) DEVICE — XI CORD BIPOLAR CAUTERY (BLUE)

## (undated) DEVICE — D HELP - CLEARVIEW CLEARIFY SYSTEM

## (undated) DEVICE — XI DRAPE COLUMN

## (undated) DEVICE — SUT MONOCRYL 4-0 27" PS-2 UNDYED

## (undated) DEVICE — PLV/PSP-ESU FORCEFX F8I7418A: Type: DURABLE MEDICAL EQUIPMENT

## (undated) DEVICE — FOLEY TRAY 16FR LF URINE METER SURESTEP

## (undated) DEVICE — ELCTR BOVIE PENCIL SMOKE EVACUATION

## (undated) DEVICE — XI ARM NEEDLE DRIVER SUTURECUT MEGA 8MM

## (undated) DEVICE — SOL IRR POUR H2O 1000ML

## (undated) DEVICE — NDL HYPO SAFE 25G X 1.5" (ORANGE)

## (undated) DEVICE — LIGASURE MARYLAND 5MM X 37CM

## (undated) DEVICE — GLV 8 PROTEXIS (WHITE)

## (undated) DEVICE — SOL IRR BAG NS 0.9% 1000ML

## (undated) DEVICE — XI OBTURATOR OPTICAL BLADELESS 8MM

## (undated) DEVICE — XI ARM FORCEP FENESTRATED BIPOLAR 8MM